# Patient Record
Sex: FEMALE | Race: WHITE | NOT HISPANIC OR LATINO | Employment: FULL TIME | URBAN - METROPOLITAN AREA
[De-identification: names, ages, dates, MRNs, and addresses within clinical notes are randomized per-mention and may not be internally consistent; named-entity substitution may affect disease eponyms.]

---

## 2016-07-06 LAB — CRC RECOMMENDATION EXT: NORMAL

## 2017-02-15 ENCOUNTER — HISTORICAL (OUTPATIENT)
Dept: ADMINISTRATIVE | Facility: HOSPITAL | Age: 47
End: 2017-02-15

## 2017-02-17 ENCOUNTER — HISTORICAL (OUTPATIENT)
Dept: RADIOLOGY | Facility: HOSPITAL | Age: 47
End: 2017-02-17

## 2022-04-10 ENCOUNTER — HISTORICAL (OUTPATIENT)
Dept: ADMINISTRATIVE | Facility: HOSPITAL | Age: 52
End: 2022-04-10

## 2022-04-22 ENCOUNTER — HISTORICAL (OUTPATIENT)
Dept: ADMINISTRATIVE | Facility: HOSPITAL | Age: 52
End: 2022-04-22

## 2022-04-25 VITALS
BODY MASS INDEX: 47.09 KG/M2 | HEIGHT: 66 IN | DIASTOLIC BLOOD PRESSURE: 88 MMHG | WEIGHT: 293 LBS | SYSTOLIC BLOOD PRESSURE: 152 MMHG | OXYGEN SATURATION: 98 %

## 2022-06-06 ENCOUNTER — HOSPITAL ENCOUNTER (INPATIENT)
Facility: HOSPITAL | Age: 52
LOS: 7 days | Discharge: HOME | DRG: 372 | End: 2022-06-13
Attending: EMERGENCY MEDICINE | Admitting: STUDENT IN AN ORGANIZED HEALTH CARE EDUCATION/TRAINING PROGRAM
Payer: COMMERCIAL

## 2022-06-06 DIAGNOSIS — Z90.3 H/O GASTRIC SLEEVE: ICD-10-CM

## 2022-06-06 DIAGNOSIS — R19.7 NAUSEA VOMITING AND DIARRHEA: ICD-10-CM

## 2022-06-06 DIAGNOSIS — E87.20 METABOLIC ACIDOSIS: ICD-10-CM

## 2022-06-06 DIAGNOSIS — E86.0 DEHYDRATION: Primary | ICD-10-CM

## 2022-06-06 DIAGNOSIS — R00.0 TACHYCARDIA: ICD-10-CM

## 2022-06-06 DIAGNOSIS — R11.2 NAUSEA VOMITING AND DIARRHEA: ICD-10-CM

## 2022-06-06 DIAGNOSIS — N17.9 ACUTE KIDNEY INJURY: ICD-10-CM

## 2022-06-06 LAB
ALBUMIN SERPL-MCNC: 4.9 GM/DL (ref 3.5–5)
ALBUMIN/GLOB SERPL: 1.2 RATIO (ref 1.1–2)
ALP SERPL-CCNC: 159 UNIT/L (ref 40–150)
ALT SERPL-CCNC: 62 UNIT/L (ref 0–55)
APPEARANCE UR: ABNORMAL
AST SERPL-CCNC: 48 UNIT/L (ref 5–34)
BACTERIA #/AREA URNS AUTO: ABNORMAL /HPF
BASOPHILS # BLD AUTO: 0.04 X10(3)/MCL (ref 0–0.2)
BASOPHILS NFR BLD AUTO: 0.5 %
BILIRUB UR QL STRIP.AUTO: NEGATIVE MG/DL
BILIRUBIN DIRECT+TOT PNL SERPL-MCNC: 0.7 MG/DL
BUN SERPL-MCNC: 19.7 MG/DL (ref 9.8–20.1)
CALCIUM SERPL-MCNC: 10.3 MG/DL (ref 8.4–10.2)
CAOX CRY URNS QL MICRO: ABNORMAL /HPF
CHLORIDE SERPL-SCNC: 111 MMOL/L (ref 98–107)
CO2 SERPL-SCNC: 12 MMOL/L (ref 22–29)
COLOR UR AUTO: ABNORMAL
CREAT SERPL-MCNC: 1.86 MG/DL (ref 0.55–1.02)
EOSINOPHIL # BLD AUTO: 0.05 X10(3)/MCL (ref 0–0.9)
EOSINOPHIL NFR BLD AUTO: 0.7 %
ERYTHROCYTE [DISTWIDTH] IN BLOOD BY AUTOMATED COUNT: 15.1 % (ref 11.5–17)
GLOBULIN SER-MCNC: 4.2 GM/DL (ref 2.4–3.5)
GLUCOSE SERPL-MCNC: 140 MG/DL (ref 74–100)
GLUCOSE UR QL STRIP.AUTO: NEGATIVE MG/DL
GRAN CASTS URNS QL MICRO: ABNORMAL /HPF
HCT VFR BLD AUTO: 50.6 % (ref 37–47)
HGB BLD-MCNC: 16.5 GM/DL (ref 12–16)
HYALINE CASTS URNS QL MICRO: ABNORMAL /HPF
IMM GRANULOCYTES # BLD AUTO: 0.03 X10(3)/MCL (ref 0–0.02)
IMM GRANULOCYTES NFR BLD AUTO: 0.4 % (ref 0–0.43)
KETONES UR QL STRIP.AUTO: ABNORMAL MG/DL
LEUKOCYTE ESTERASE UR QL STRIP.AUTO: ABNORMAL UNIT/L
LIPASE SERPL-CCNC: 29 U/L
LYMPHOCYTES # BLD AUTO: 1.06 X10(3)/MCL (ref 0.6–4.6)
LYMPHOCYTES NFR BLD AUTO: 13.9 %
MAGNESIUM SERPL-MCNC: 2.3 MG/DL (ref 1.6–2.6)
MCH RBC QN AUTO: 30.3 PG (ref 27–31)
MCHC RBC AUTO-ENTMCNC: 32.6 MG/DL (ref 33–36)
MCV RBC AUTO: 93 FL (ref 80–94)
MONOCYTES # BLD AUTO: 0.62 X10(3)/MCL (ref 0.1–1.3)
MONOCYTES NFR BLD AUTO: 8.1 %
NEUTROPHILS # BLD AUTO: 5.8 X10(3)/MCL (ref 2.1–9.2)
NEUTROPHILS NFR BLD AUTO: 76.4 %
NITRITE UR QL STRIP.AUTO: NEGATIVE
NRBC BLD AUTO-RTO: 0 %
PH UR STRIP.AUTO: 5.5 [PH]
PHOSPHATE SERPL-MCNC: 5.6 MG/DL (ref 2.3–4.7)
PLATELET # BLD AUTO: 274 X10(3)/MCL (ref 130–400)
PMV BLD AUTO: 11.5 FL (ref 9.4–12.4)
POTASSIUM SERPL-SCNC: 3.5 MMOL/L (ref 3.5–5.1)
PROT SERPL-MCNC: 9.1 GM/DL (ref 6.4–8.3)
PROT UR QL STRIP.AUTO: ABNORMAL MG/DL
RBC # BLD AUTO: 5.44 X10(6)/MCL (ref 4.2–5.4)
RBC #/AREA URNS AUTO: <5 /HPF
RBC UR QL AUTO: ABNORMAL UNIT/L
SARS-COV-2 RDRP RESP QL NAA+PROBE: NEGATIVE
SODIUM SERPL-SCNC: 140 MMOL/L (ref 136–145)
SP GR UR STRIP.AUTO: 1.02 (ref 1–1.03)
SQUAMOUS #/AREA URNS AUTO: 10 /LPF
UROBILINOGEN UR STRIP-ACNC: 0.2 MG/DL
WBC # SPEC AUTO: 7.6 X10(3)/MCL (ref 4.5–11.5)
WBC #/AREA URNS AUTO: 75 /HPF

## 2022-06-06 PROCEDURE — 96374 THER/PROPH/DIAG INJ IV PUSH: CPT

## 2022-06-06 PROCEDURE — 11000001 HC ACUTE MED/SURG PRIVATE ROOM

## 2022-06-06 PROCEDURE — 85025 COMPLETE CBC W/AUTO DIFF WBC: CPT | Performed by: EMERGENCY MEDICINE

## 2022-06-06 PROCEDURE — 81001 URINALYSIS AUTO W/SCOPE: CPT | Performed by: EMERGENCY MEDICINE

## 2022-06-06 PROCEDURE — 93005 ELECTROCARDIOGRAM TRACING: CPT

## 2022-06-06 PROCEDURE — 84100 ASSAY OF PHOSPHORUS: CPT | Performed by: EMERGENCY MEDICINE

## 2022-06-06 PROCEDURE — 96361 HYDRATE IV INFUSION ADD-ON: CPT

## 2022-06-06 PROCEDURE — 99285 EMERGENCY DEPT VISIT HI MDM: CPT | Mod: 25

## 2022-06-06 PROCEDURE — 87635 SARS-COV-2 COVID-19 AMP PRB: CPT | Performed by: EMERGENCY MEDICINE

## 2022-06-06 PROCEDURE — 63600175 PHARM REV CODE 636 W HCPCS: Performed by: PHYSICIAN ASSISTANT

## 2022-06-06 PROCEDURE — 83735 ASSAY OF MAGNESIUM: CPT | Performed by: EMERGENCY MEDICINE

## 2022-06-06 PROCEDURE — 84075 ASSAY ALKALINE PHOSPHATASE: CPT | Performed by: EMERGENCY MEDICINE

## 2022-06-06 PROCEDURE — 25000003 PHARM REV CODE 250: Performed by: EMERGENCY MEDICINE

## 2022-06-06 PROCEDURE — 36415 COLL VENOUS BLD VENIPUNCTURE: CPT | Performed by: EMERGENCY MEDICINE

## 2022-06-06 PROCEDURE — 83690 ASSAY OF LIPASE: CPT | Performed by: EMERGENCY MEDICINE

## 2022-06-06 PROCEDURE — 63600175 PHARM REV CODE 636 W HCPCS: Performed by: EMERGENCY MEDICINE

## 2022-06-06 PROCEDURE — 80053 COMPREHEN METABOLIC PANEL: CPT | Performed by: EMERGENCY MEDICINE

## 2022-06-06 RX ORDER — IBUPROFEN 200 MG
24 TABLET ORAL
Status: DISCONTINUED | OUTPATIENT
Start: 2022-06-06 | End: 2022-06-13 | Stop reason: HOSPADM

## 2022-06-06 RX ORDER — ONDANSETRON 2 MG/ML
4 INJECTION INTRAMUSCULAR; INTRAVENOUS EVERY 4 HOURS PRN
Status: DISCONTINUED | OUTPATIENT
Start: 2022-06-06 | End: 2022-06-13 | Stop reason: HOSPADM

## 2022-06-06 RX ORDER — SODIUM CHLORIDE 0.9 % (FLUSH) 0.9 %
10 SYRINGE (ML) INJECTION EVERY 12 HOURS PRN
Status: DISCONTINUED | OUTPATIENT
Start: 2022-06-06 | End: 2022-06-13 | Stop reason: HOSPADM

## 2022-06-06 RX ORDER — ENOXAPARIN SODIUM 100 MG/ML
40 INJECTION SUBCUTANEOUS EVERY 24 HOURS
Status: DISCONTINUED | OUTPATIENT
Start: 2022-06-06 | End: 2022-06-13 | Stop reason: HOSPADM

## 2022-06-06 RX ORDER — ONDANSETRON 2 MG/ML
8 INJECTION INTRAMUSCULAR; INTRAVENOUS ONCE
Status: COMPLETED | OUTPATIENT
Start: 2022-06-06 | End: 2022-06-06

## 2022-06-06 RX ORDER — ACETAMINOPHEN 325 MG/1
650 TABLET ORAL EVERY 8 HOURS PRN
Status: DISCONTINUED | OUTPATIENT
Start: 2022-06-06 | End: 2022-06-13 | Stop reason: HOSPADM

## 2022-06-06 RX ORDER — GLUCAGON 1 MG
1 KIT INJECTION
Status: DISCONTINUED | OUTPATIENT
Start: 2022-06-06 | End: 2022-06-13 | Stop reason: HOSPADM

## 2022-06-06 RX ORDER — ACETAMINOPHEN 325 MG/1
650 TABLET ORAL EVERY 4 HOURS PRN
Status: DISCONTINUED | OUTPATIENT
Start: 2022-06-06 | End: 2022-06-13 | Stop reason: HOSPADM

## 2022-06-06 RX ORDER — IBUPROFEN 200 MG
16 TABLET ORAL
Status: DISCONTINUED | OUTPATIENT
Start: 2022-06-06 | End: 2022-06-13 | Stop reason: HOSPADM

## 2022-06-06 RX ADMIN — ONDANSETRON 4 MG: 2 INJECTION INTRAMUSCULAR; INTRAVENOUS at 08:06

## 2022-06-06 RX ADMIN — THIAMINE HYDROCHLORIDE 100 MG: 100 INJECTION, SOLUTION INTRAMUSCULAR; INTRAVENOUS at 11:06

## 2022-06-06 RX ADMIN — SODIUM BICARBONATE: 84 INJECTION, SOLUTION INTRAVENOUS at 11:06

## 2022-06-06 RX ADMIN — SODIUM CHLORIDE, POTASSIUM CHLORIDE, SODIUM LACTATE AND CALCIUM CHLORIDE 1000 ML: 600; 310; 30; 20 INJECTION, SOLUTION INTRAVENOUS at 08:06

## 2022-06-06 RX ADMIN — ENOXAPARIN SODIUM 40 MG: 40 INJECTION SUBCUTANEOUS at 04:06

## 2022-06-06 RX ADMIN — SODIUM BICARBONATE: 84 INJECTION, SOLUTION INTRAVENOUS at 03:06

## 2022-06-06 RX ADMIN — ONDANSETRON 8 MG: 2 INJECTION INTRAMUSCULAR; INTRAVENOUS at 08:06

## 2022-06-06 NOTE — H&P
[facility]  Hospital Medicine History & Physical Examination       Patient Name: Zoe Randall  MRN: [id number]  Patient Class: IP- Inpatient   Admission Date: 6/6/2022   Admitting Physician: Seun Ward MD   Length of Stay: 0  Attending Physician: Seun Ward MD   Primary Care Provider: Bonnie Bella MD  Face-to-Face encounter date: 06/06/2022  Code Status: Full Code  Chief Complaint: Abdominal Pain, Vomiting, and Diarrhea (Pt presents with generalized abd pain with n/v/d. Onset Friday.  Denies fever.  Gastric sleeve in April/ dr oneil in [facility]. )        Patient information was obtained from patient, patient's family, past medical records and ER records.     HISTORY OF PRESENT ILLNESS:   Zoe Randall is a 51 y.o. White female with a past medical history of gastric sleeve 2017 and duodenal switch on April 5th, 2022 in [place], [place] with Dr. Franck Choi. The patient presented to [facility] on 6/6/2022 with a primary complaint of generalized abdominal cramping, vomiting and diarrhea which began 6/3/2022 after eating chicken crackling. Patient reports she was started on a full regular diet on week 8 and has been tolerating other foods well. At start of symptoms patient was having 2-3 episodes of diarrhea every hour but over the last day once every 3 hours. She states vomiting began yesterday (6/5/2022).  She reports associated symptoms of nausea, headache and muscle cramps.  She denies complaints of fever, chest pain, shortness a breath and sick contacts. She reports an approximate 20 pound weight loss since onset of symptoms.      Upon presentation to the ED, heart rate 124 and hemodynamically stable. Labs notable for H&H 16.5/50.6, WBC 7.6, chloride 111, CO2 12, BUN/creatinine 19.7/1.86, glucose 140, alkaline phosphatase 159, AST 48 and ALT 62. In the ED patient received Thiamine and Zofran. Patient is admitted to hospital medicine services for further medical management.      PAST MEDICAL HISTORY:   Reviewed and negative    PAST SURGICAL HISTORY:   Gastric sleeve with duodenal switch on 4/5/2022 in [place], [place]  Meniscus repair  Cholecystectomy  Breast reduction    ALLERGIES:   Patient has no known allergies.    FAMILY HISTORY:   Reviewed and negative    SOCIAL HISTORY:   Tobacco - Denies  Alcohol - Denies  Illicit Drugs - Denies    HOME MEDICATIONS:   As documented    REVIEW OF SYSTEMS:   Except as documented, all other systems reviewed and negative     PHYSICAL EXAM:     VITAL SIGNS: 24 HRS MIN & MAX LAST   Temp  Min: 97.5 °F (36.4 °C)  Max: 97.5 °F (36.4 °C) 97.5 °F (36.4 °C)   BP  Min: 114/87  Max: 131/87 114/87   Pulse  Min: 119  Max: 124  (!) 119   Resp  Min: 19  Max: 20 19   SpO2  Min: 96 %  Max: 96 % 96 %       General appearance: Well-developed, well-nourished female in no apparent distress. No family at bedside. Eating clear liquid diet.  HEENT: Atraumatic head. Moist mucous membranes of oral cavity.  Lungs: Clear to auscultation bilaterally.   Heart: Tachycardic rate and rhythm.   Abdomen: Soft, non-distended, non-tender. Bowel sounds are normal.   Extremities: No cyanosis, clubbing. No deformities.  Skin: No Rash. Warm and dry.  Neuro: Awake, alert and oriented. Motor and sensory exams grossly intact.  Psych/mental status: Appropriate mood and affect. Cooperative. Responds appropriately to questions.       LABS AND IMAGING:     Recent Labs   Lab 06/06/22  0842   WBC 7.6   RBC 5.44*   HGB 16.5*   HCT 50.6*   MCV 93.0   MCH 30.3   MCHC 32.6*   RDW 15.1      MPV 11.5       Recent Labs   Lab 06/06/22  0842      K 3.5   CO2 12*   BUN 19.7   CREATININE 1.86*   CALCIUM 10.3*   MG 2.30   ALBUMIN 4.9   ALKPHOS 159*   ALT 62*   AST 48*   BILITOT 0.7       Microbiology Results (last 7 days)       ** No results found for the last 168 hours. **             CT Abdomen Pelvis With Contrast  Clinical History:  Abdominal Pain     Technique:  Multidetector IV contrast enhanced  axial CT images of the abdomen and  pelvis were obtained with coronal and sagittal reconstructions.      Automatic exposure control was utilized to reduce the patient's  radiation dose.     [id number]=[id number]     Comparison:  5/5/2014     Findings:     01. HEPATOBILIARY: No focal hepatic lesion is identified, status post  cholecystectomy.     02. SPLEEN: Normal     03. PANCREAS: No focal masses or ductal dilatation.     04. ADRENALS: No adrenal nodules.     05. KIDNEYS: The right kidney demonstrates no stone, hydronephrosis,  or hydroureter. No focal mass identified.The left kidney demonstrates  no stone, hydronephrosis, or hydroureter. No focal mass identified.     06. LYMPHADENOPATHY/RETROPERITONEUM: There is no retroperitoneal  lymphadenopathy. The abdominal aorta is normal in course and caliber.      07. BOWEL: Postsurgical changes of the stomach. No acute abnormality.     08. PELVIC VISCERA: Intrauterine IUD noted.     09. PELVIC LYMPH NODES: No lymphadenopathy.     10. PERITONEUM/ABDOMINAL WALL: No ascites or implant.     11. SKELETAL: No aggressive appearing lytic/blastic lesion. No acute  fractures, subluxations or dislocations.     12. LUNG BASES: The visualized lungs are unremarkable.     Impression  No acute abnormality identified within the abdomen and pelvis.  Postsurgical changes of the stomach are noted with no acute  abnormality.       Electronically Signed By: Artis Pickard MD  Date/Time Signed: 04/22/2022 18:34        ASSESSMENT & PLAN:   Assessment:  Intravascular volume depletion secondary to intractable nausea vomiting  Status post gastric sleeve and duodenal switch on 4/5/2022  NAGMA   Polycythemia  Acute kidney injury  Elevated alkaline phosphatase    Plan:  - Clear liquid diet as tolerated  - IVF hydration  - General surgery consulted. Appreciate recommendations  - Labs in AM       VTE Prophylaxis: will be placed on Lovenox for DVT prophylaxis and will be advised to be as mobile as possible and  sit in a chair as tolerated      __________________________________________________________________________  INPATIENT LIST OF MEDICATIONS     Current Facility-Administered Medications:     acetaminophen tablet 650 mg, 650 mg, Oral, Q8H PRN, MIHAELA Sun-MARVEL    acetaminophen tablet 650 mg, 650 mg, Oral, Q4H PRN, MIHAELA Sun-MARVEL    dextrose 10% bolus 125 mL, 12.5 g, Intravenous, PRN, MIHAELA Sun-MARVEL    dextrose 10% bolus 250 mL, 25 g, Intravenous, PRN, MIHAELA Sun-MARVEL    enoxaparin injection 40 mg, 40 mg, Subcutaneous, Daily, Nargis Pelletier PA-C    glucagon (human recombinant) injection 1 mg, 1 mg, Intramuscular, PRN, Nargis Pelletier PA-C    glucose chewable tablet 16 g, 16 g, Oral, PRN, MIHAELA Sun-MARVEL    glucose chewable tablet 24 g, 24 g, Oral, PRN, Nargis Pelletier PA-C    ondansetron injection 4 mg, 4 mg, Intravenous, Q4H PRN, Nargis Pelletier PA-C    sodium bicarbonate 150 mEq in dextrose 5 % 1,150 mL infusion, , Intravenous, Continuous, Karina Leon MD    sodium chloride 0.9% flush 10 mL, 10 mL, Intravenous, Q12H PRN, Nargis Pelletier PA-C    thiamine (B-1) 100 mg in dextrose 5 % 100 mL IVPB, 100 mg, Intravenous, ED 1 Time, Karina Leon MD, Last Rate: 200 mL/hr at 06/06/22 1104, 100 mg at 06/06/22 1104  No current outpatient medications on file.      Scheduled Meds:   enoxaparin  40 mg Subcutaneous Daily    thiamine (VITAMIN B1) IVPB  100 mg Intravenous ED 1 Time     Continuous Infusions:   sodium bicarbonate drip       PRN Meds:.acetaminophen, acetaminophen, dextrose 10%, dextrose 10%, glucagon (human recombinant), glucose, glucose, ondansetron, sodium chloride 0.9%      Discharge Planning and Disposition: Anticipated discharge to be determined.    I, MIHAELA Trinh, have reviewed and discussed the case with Dr. Seun Ward MD    Please see the following addendum for further assessment and plan from there attending MD.    Nargis Pelletier,  PA-C  06/06/2022    ________________________________________________________________________________    MD Addendum:  I, Dr. Seun Ward MD assumed care of this patient today at 12:00 pm  For the patient encounter, I performed the substantive portion of the visit, I reviewed the NP/PA documentation, treatment plan, and medical decision making.  I had face to face time with this patient     A. History:  Seen and examined the patient reviewed above history physical exam and management.  She is coming with excessive diarrhea and abdominal cramping started 2 days ago after she advanced her die.   Since the surgery patient lost almost 50 lb.  However in last 2 days she lost reportedly 18 lb most likely from diarrhea.  She denies having any other issues or any other symptoms.  She denies having any other medical issues.    Agree with above physical exam.     C. Medical decision making:  Will give patient 1 the stage of LR bolus, she received 1 L in the ER.  Continue with bicarb drip 125 cc/hour afterwards.  Monitor labs.  Will add Imodium 2 mg Q 4.  Appreciate bariatric surgery recommendations,   - continue thiamine supplementation.         All diagnosis and differential diagnosis have been reviewed; assessment and plan has been documented; I have personally reviewed the labs and test results that are presently available; I have reviewed the patients medication list; I have reviewed the consulting providers response and recommendations. I have reviewed or attempted to review medical records based upon their availability.    All of the patient and family questions have been addressed and answered. Patient's is agreeable to the above stated plan. I will continue to monitor closely and make adjustments to medical management as needed.      Seun Ward MD  06/06/2022

## 2022-06-07 LAB
ALBUMIN SERPL-MCNC: 3.6 GM/DL (ref 3.5–5)
ALBUMIN/GLOB SERPL: 1.1 RATIO (ref 1.1–2)
ALP SERPL-CCNC: 117 UNIT/L (ref 40–150)
ALT SERPL-CCNC: 43 UNIT/L (ref 0–55)
AST SERPL-CCNC: 30 UNIT/L (ref 5–34)
BASOPHILS # BLD AUTO: 0.04 X10(3)/MCL (ref 0–0.2)
BASOPHILS NFR BLD AUTO: 0.8 %
BILIRUBIN DIRECT+TOT PNL SERPL-MCNC: 0.6 MG/DL
BUN SERPL-MCNC: 12.1 MG/DL (ref 9.8–20.1)
CALCIUM SERPL-MCNC: 9.4 MG/DL (ref 8.4–10.2)
CHLORIDE SERPL-SCNC: 107 MMOL/L (ref 98–107)
CO2 SERPL-SCNC: 21 MMOL/L (ref 22–29)
CREAT SERPL-MCNC: 0.78 MG/DL (ref 0.55–1.02)
EOSINOPHIL # BLD AUTO: 0.17 X10(3)/MCL (ref 0–0.9)
EOSINOPHIL NFR BLD AUTO: 3.2 %
ERYTHROCYTE [DISTWIDTH] IN BLOOD BY AUTOMATED COUNT: 14.6 % (ref 11.5–17)
GLOBULIN SER-MCNC: 3.4 GM/DL (ref 2.4–3.5)
GLUCOSE SERPL-MCNC: 120 MG/DL (ref 74–100)
HCT VFR BLD AUTO: 40.8 % (ref 37–47)
HGB BLD-MCNC: 13.5 GM/DL (ref 12–16)
IMM GRANULOCYTES # BLD AUTO: 0.02 X10(3)/MCL (ref 0–0.02)
IMM GRANULOCYTES NFR BLD AUTO: 0.4 % (ref 0–0.43)
LYMPHOCYTES # BLD AUTO: 1.51 X10(3)/MCL (ref 0.6–4.6)
LYMPHOCYTES NFR BLD AUTO: 28.5 %
MCH RBC QN AUTO: 30.1 PG (ref 27–31)
MCHC RBC AUTO-ENTMCNC: 33.1 MG/DL (ref 33–36)
MCV RBC AUTO: 91.1 FL (ref 80–94)
MONOCYTES # BLD AUTO: 0.62 X10(3)/MCL (ref 0.1–1.3)
MONOCYTES NFR BLD AUTO: 11.7 %
NEUTROPHILS # BLD AUTO: 2.9 X10(3)/MCL (ref 2.1–9.2)
NEUTROPHILS NFR BLD AUTO: 55.4 %
NRBC BLD AUTO-RTO: 0 %
PLATELET # BLD AUTO: 181 X10(3)/MCL (ref 130–400)
PMV BLD AUTO: 11.3 FL (ref 9.4–12.4)
POTASSIUM SERPL-SCNC: 2 MMOL/L (ref 3.5–5.1)
PROT SERPL-MCNC: 7 GM/DL (ref 6.4–8.3)
RBC # BLD AUTO: 4.48 X10(6)/MCL (ref 4.2–5.4)
SODIUM SERPL-SCNC: 139 MMOL/L (ref 136–145)
WBC # SPEC AUTO: 5.3 X10(3)/MCL (ref 4.5–11.5)

## 2022-06-07 PROCEDURE — 25000003 PHARM REV CODE 250: Performed by: EMERGENCY MEDICINE

## 2022-06-07 PROCEDURE — 80053 COMPREHEN METABOLIC PANEL: CPT | Performed by: PHYSICIAN ASSISTANT

## 2022-06-07 PROCEDURE — 25000003 PHARM REV CODE 250: Performed by: PHYSICIAN ASSISTANT

## 2022-06-07 PROCEDURE — 11000001 HC ACUTE MED/SURG PRIVATE ROOM

## 2022-06-07 PROCEDURE — 36415 COLL VENOUS BLD VENIPUNCTURE: CPT | Performed by: PHYSICIAN ASSISTANT

## 2022-06-07 PROCEDURE — 25000003 PHARM REV CODE 250: Performed by: STUDENT IN AN ORGANIZED HEALTH CARE EDUCATION/TRAINING PROGRAM

## 2022-06-07 PROCEDURE — 63600175 PHARM REV CODE 636 W HCPCS: Performed by: NURSE PRACTITIONER

## 2022-06-07 PROCEDURE — 25000003 PHARM REV CODE 250: Performed by: NURSE PRACTITIONER

## 2022-06-07 PROCEDURE — 63600175 PHARM REV CODE 636 W HCPCS: Performed by: PHYSICIAN ASSISTANT

## 2022-06-07 PROCEDURE — 85025 COMPLETE CBC W/AUTO DIFF WBC: CPT | Performed by: PHYSICIAN ASSISTANT

## 2022-06-07 RX ORDER — LOPERAMIDE HYDROCHLORIDE 2 MG/1
2 CAPSULE ORAL 4 TIMES DAILY PRN
Status: DISCONTINUED | OUTPATIENT
Start: 2022-06-07 | End: 2022-06-08

## 2022-06-07 RX ORDER — POTASSIUM CHLORIDE 20 MEQ/1
40 TABLET, EXTENDED RELEASE ORAL EVERY 4 HOURS
Status: COMPLETED | OUTPATIENT
Start: 2022-06-07 | End: 2022-06-07

## 2022-06-07 RX ORDER — DIPHENOXYLATE HYDROCHLORIDE AND ATROPINE SULFATE 2.5; .025 MG/1; MG/1
1 TABLET ORAL 4 TIMES DAILY
Status: DISCONTINUED | OUTPATIENT
Start: 2022-06-07 | End: 2022-06-08

## 2022-06-07 RX ORDER — POTASSIUM CHLORIDE 14.9 MG/ML
40 INJECTION INTRAVENOUS ONCE
Status: COMPLETED | OUTPATIENT
Start: 2022-06-07 | End: 2022-06-07

## 2022-06-07 RX ADMIN — POTASSIUM BICARBONATE 50 MEQ: 977.5 TABLET, EFFERVESCENT ORAL at 06:06

## 2022-06-07 RX ADMIN — ENOXAPARIN SODIUM 40 MG: 40 INJECTION SUBCUTANEOUS at 04:06

## 2022-06-07 RX ADMIN — POTASSIUM CHLORIDE 40 MEQ: 20 TABLET, EXTENDED RELEASE ORAL at 10:06

## 2022-06-07 RX ADMIN — SODIUM BICARBONATE: 84 INJECTION, SOLUTION INTRAVENOUS at 10:06

## 2022-06-07 RX ADMIN — DIPHENOXYLATE HYDROCHLORIDE AND ATROPINE SULFATE 1 TABLET: 2.5; .025 TABLET ORAL at 08:06

## 2022-06-07 RX ADMIN — POTASSIUM CHLORIDE 40 MEQ: 20 TABLET, EXTENDED RELEASE ORAL at 05:06

## 2022-06-07 RX ADMIN — ACETAMINOPHEN 650 MG: 325 TABLET ORAL at 04:06

## 2022-06-07 RX ADMIN — POTASSIUM CHLORIDE 40 MEQ: 14.9 INJECTION, SOLUTION INTRAVENOUS at 05:06

## 2022-06-07 RX ADMIN — LOPERAMIDE HYDROCHLORIDE 2 MG: 2 CAPSULE ORAL at 09:06

## 2022-06-07 RX ADMIN — DIPHENOXYLATE HYDROCHLORIDE AND ATROPINE SULFATE 1 TABLET: 2.5; .025 TABLET ORAL at 04:06

## 2022-06-07 RX ADMIN — POTASSIUM CHLORIDE 40 MEQ: 20 TABLET, EXTENDED RELEASE ORAL at 02:06

## 2022-06-07 NOTE — PROGRESS NOTES
[facility] Hospital Medicine Progress Note        Chief Complaint: Inpatient Follow-up for     Subjective:  Zoe Randall is a 51 y.o. White female with a past medical history of gastric sleeve 2017 and duodenal switch on April 5th, 2022 in [place], [place] with Dr. Franck Choi. The patient presented to [facility] on 6/6/2022 with a primary complaint of generalized abdominal cramping, vomiting and diarrhea which began 6/3/2022 after eating chicken crackling. Patient reports she was started on a full regular diet on week 8 and has been tolerating other foods well. At start of symptoms patient was having 2-3 episodes of diarrhea every hour but over the last day once every 3 hours. She states vomiting began yesterday (6/5/2022).  She reports associated symptoms of nausea, headache and muscle cramps.  She denies complaints of fever, chest pain, shortness a breath and sick contacts. She reports an approximate 20 pound weight loss since onset of symptoms.       Upon presentation to the ED, heart rate 124 and hemodynamically stable. Labs notable for H&H 16.5/50.6, WBC 7.6, chloride 111, CO2 12, BUN/creatinine 19.7/1.86, glucose 140, alkaline phosphatase 159, AST 48 and ALT 62. In the ED patient received Thiamine and Zofran. Patient is admitted to hospital medicine services for further medical management.     Objective/physical exam:  General appearance: Well-developed, well-nourished female in no apparent distress. No family at bedside. Eating clear liquid diet.  HEENT: Atraumatic head. Moist mucous membranes of oral cavity.  Lungs: Clear to auscultation bilaterally.   Heart: Tachycardic rate and rhythm.   Abdomen: Soft, non-distended, non-tender. Bowel sounds are normal.   Extremities: No cyanosis, clubbing. No deformities.  Skin: No Rash. Warm and dry.  Neuro: Awake, alert and oriented. Motor and sensory exams grossly intact.  Psych/mental status: Appropriate mood and affect. Cooperative.  Responds appropriately to questions.     VITAL SIGNS: 24 HRS MIN & MAX LAST   Temp  Min: 97.7 °F (36.5 °C)  Max: 98.2 °F (36.8 °C) 98 °F (36.7 °C)   BP  Min: 101/68  Max: 114/84 113/80   Pulse  Min: 73  Max: 92  83   Resp  Min: 16  Max: 18 18   SpO2  Min: 96 %  Max: 99 % 97 %       Labs, Microbiology and Imaging were Reviewed.      Microbiology Results (last 7 days)     Procedure Component Value Units Date/Time    Urine culture [[id number]] Collected: 06/06/22 1026    Order Status: Completed Specimen: Urine Updated: 06/07/22 0700     Urine Culture No Growth At 24 Hours             Medications   diphenoxylate-atropine 2.5-0.025 mg  1 tablet Oral QID    enoxaparin  40 mg Subcutaneous Daily    potassium chloride  40 mEq Oral Q4H        acetaminophen, acetaminophen, dextrose 10%, dextrose 10%, glucagon (human recombinant), glucose, glucose, loperamide, ondansetron, sodium chloride 0.9%     Radiology:  CT Abdomen Pelvis With Contrast  Clinical History:  Abdominal Pain     Technique:  Multidetector IV contrast enhanced axial CT images of the abdomen and  pelvis were obtained with coronal and sagittal reconstructions.      Automatic exposure control was utilized to reduce the patient's  radiation dose.     JVQ=[id number]     Comparison:  5/5/2014     Findings:     01. HEPATOBILIARY: No focal hepatic lesion is identified, status post  cholecystectomy.     02. SPLEEN: Normal     03. PANCREAS: No focal masses or ductal dilatation.     04. ADRENALS: No adrenal nodules.     05. KIDNEYS: The right kidney demonstrates no stone, hydronephrosis,  or hydroureter. No focal mass identified.The left kidney demonstrates  no stone, hydronephrosis, or hydroureter. No focal mass identified.     06. LYMPHADENOPATHY/RETROPERITONEUM: There is no retroperitoneal  lymphadenopathy. The abdominal aorta is normal in course and caliber.      07. BOWEL: Postsurgical changes of the stomach. No acute abnormality.     08. PELVIC VISCERA: Intrauterine IUD  noted.     09. PELVIC LYMPH NODES: No lymphadenopathy.     10. PERITONEUM/ABDOMINAL WALL: No ascites or implant.     11. SKELETAL: No aggressive appearing lytic/blastic lesion. No acute  fractures, subluxations or dislocations.     12. LUNG BASES: The visualized lungs are unremarkable.     Impression  No acute abnormality identified within the abdomen and pelvis.  Postsurgical changes of the stomach are noted with no acute  abnormality.       Electronically Signed By: Artis Pickard MD  Date/Time Signed: 04/22/2022 18:34          Assessment/Plan:  Intravascular volume depletion secondary to intractable nausea vomiting  Status post gastric sleeve and duodenal switch on 4/5/2022  NAGMA   Hypokalemia   Polycythemia  Acute kidney injury  Elevated alkaline phosphatase      - Continue with bicarb drip 125 cc/hour for now.   - continue Imodium 2 mg Q4 and lomotil q4   - encouraged PO intake   Appreciate bariatric surgery recommendations,   - Continue thiamine supplementation.   - Continue to replace the potassium K.     Critical care diagnosis hypokalemia replacing   Critical care time was given 45 min     All diagnosis and differential diagnosis have been reviewed; assessment and plan has been documented; I have personally reviewed the labs and test results that are presently available; I have reviewed the patients medication list; I have reviewed the consulting providers response and recommendations. I have reviewed or attempted to review medical records based upon their availability.       Seun Ward MD   06/07/2022

## 2022-06-07 NOTE — PLAN OF CARE
06/07/22 1214   Discharge Assessment   Assessment Type Discharge Planning Assessment   Source of Information patient   Reason For Admission tachycardia, TRINH   Lives With spouse;child(linda), dependent   Do you expect to return to your current living situation? Yes   Do you have help at home or someone to help you manage your care at home? Yes   Who are your caregiver(s) and their phone number(s)? Te, significant other, [phone]   Prior to hospitilization cognitive status: Alert/Oriented;No Deficits   Current cognitive status: Alert/Oriented;No Deficits   Walking or Climbing Stairs Difficulty none   Dressing/Bathing Difficulty none   Equipment Currently Used at Home none   Do you currently have service(s) that help you manage your care at home? No   Who is going to help you get home at discharge? Te   How do you get to doctors appointments? car, drives self;family or friend will provide   Are you on dialysis? No   Do you take coumadin? No   Discharge Plan A Home with family   Discharge Plan B Home with family   Discharge Plan discussed with: Patient   Relationship/Environment   Name(s) of Who Lives With Patient Te   Pt lives w/ significant other and 6 year old daughter. Pt independent prior to admission. No HH. No DME.

## 2022-06-07 NOTE — PROGRESS NOTES
"[facility] - 8th Floor Med Surg  Adult Nutrition  Progress Note    SUMMARY       Recommendations    Recommendation/Intervention:   1- Advance diet as tolerated per MD. Goal Diet per MD.     2- Replete potassium as medically feasible.    3- If unable to advance diet in the next 2-3 days, consider PPN. Recommend Clinimix E 4.25/5 @ 75mL/hr with IVPB IL 20% 250mL twice per week.    Assessment and Plan  6/7/22: Unable to visit with pt during rounds- will f/u early with pt; pt on clears- noted admitted with n/v.     Malnutrition Assessment  Malnutrition Type: acute illness or injury, other (see comments) (unable to evaluate at this time)    Reason for Assessment    Reason For Assessment: identified at risk by screening criteria (MST >/=2)  Diagnosis:  Intravascular volume depletion secondary to intractable nausea vomiting  Status post gastric sleeve and duodenal switch on 4/5/2022  NAGMA   Polycythemia  Acute kidney injury  Elevated alkaline phosphatase  Relevant Medical History: gastric sleeve 2017 and duodenal switch on April 5th, 2022    Nutrition Risk Screen    Nutrition Risk Screen: no indicators present    Nutrition/Diet History    Factors Affecting Nutritional Intake: clear liquid diet    Anthropometrics    Temp: 98.2 °F (36.8 °C)  Height Method: Stated  Height: 5' 5" (165.1 cm)  Height (inches): 65 in  Weight Method: Stated  Weight: 118.4 kg (261 lb)  Weight (lb): 261 lb  Ideal Body Weight (IBW), Female: 125 lb  % Ideal Body Weight, Female (lb): 208.8 %  BMI (Calculated): 43.4  BMI Grade: greater than 40 - morbid obesity    Lab/Procedures/Meds    Pertinent Labs Reviewed: reviewed  Pertinent Labs Comments: 6/7/22: K 2, Glu 120, GFR>60  Pertinent Medications Reviewed: reviewed  Pertinent Medications Comments: loperamide PRN, Zofran PRN, Kcl, sodium bicarbonate    Estimated/Assessed Needs    Weight Used For Calorie Calculations: 118.4 kg (261 lb 0.4 oz)  Energy Calorie Requirements (kcal): 1980 (MSJ x " 1.1 SF)  Energy Need Method: [place]- Emy  Protein Requirements: 131gm (1.1g/kg)  Weight Used For Protein Calculations: 118.4 kg (261 lb 0.4 oz)  Fluid Requirements (mL): 1980  Estimated Fluid Requirement Method: RDA Method  RDA Method (mL): 1980    Nutrition Prescription Ordered    Current Diet Order: Clear Liquid Diet    Evaluation of Received Nutrient/Fluid Intake    Energy Calories Required: not meeting needs  Protein Required: not meeting needs  Fluid Required: not meeting needs  % Intake of Estimated Energy Needs: 0 - 25 %  % Meal Intake: Other: unable to evaluate    Nutrition Risk    Level of Risk/Frequency of Follow-up: moderate     Nutrition Problem  Inadequate Energy Intake    Related to (etiology):   Current condition    Signs and Symptoms (as evidenced by):   NPO/clear liquid diet since admit    Interventions(treatment strategy):  Modified composition of meals / snacks, Multivitamin / Mineral supplement therapy and Collaboration with other providers    Nutrition Diagnosis Status:   New    Goals: Meet >/=75% est energy needs by f/u  Nutrition Goal Status: new    Monitor and Evaluation    Food and Nutrient Intake: food and beverage intake, energy intake  Anthropometric Measurements: weight  Biochemical Data, Medical Tests and Procedures: electrolyte and renal panel     Nutrition Follow-Up    RD Follow-up?: Yes

## 2022-06-07 NOTE — CONSULTS
"Date of consult: 6-7-2022    Reason for consult: Dehydration/diarrhea s/p laparoscopic conversion vertical sleeve gastrectomy to duodenal switch.    HPI:  Zoe Randall is a 51 y.o. White female with a past medical history of gastric sleeve 2017 and duodenal switch on April 5th, 2022 in [place], [place] with Dr. Franck Choi. The patient presented to [facility] on 6/6/2022 with a primary complaint of generalized abdominal cramping, vomiting and diarrhea which began 6/3/2022 after eating chicken crackling. Patient reports she was started on a full regular diet on week 8 and has been tolerating other foods well. At start of symptoms patient was having 2-3 episodes of diarrhea every hour but over the last day once every 3 hours. She states vomiting began yesterday (6/5/2022).  She reports associated symptoms of nausea, headache and muscle cramps.  She denies complaints of fever, chest pain, shortness a breath and sick contacts. She reports an approximate 20 pound weight loss since onset of symptoms.       Upon presentation to the ED, heart rate 124 and hemodynamically stable. Labs notable for H&H 16.5/50.6, WBC 7.6, chloride 111, CO2 12, BUN/creatinine 19.7/1.86, glucose 140, alkaline phosphatase 159, AST 48 and ALT 62. In the ED patient received Thiamine and Zofran.     Above take from initial h/p  I discussed her bariatric hx as well  Prior Lap Lap band and Lap band removal  Lap VSG 2017, lost 50 lbs, gained all wt back  Lap Duodenal switch April 2022 in [place]   She was around 330lbs at time of last surgery  Is now around 275 lbs  Was able to eat a more "normal diet" weeks after latest surgery, but 2 days ago, developed severe pain, nausea, worsening diarrhea after eating cracklins.   She is used to having more lose bowel movements in [facility] her DS surgery.  She presented to hospital with worsening of symptoms and indeed suffers from naida/dehydration/hypertranaminasemia and above listed derangements.    She does " "feel that she eats compulsively and is considering joining or has joined [employer].  It is unclear if she was binge eating at time of change in symptoms, but likely was far off of her post bariatric diet plan        Exam:   aox4 nad  Cn II-XII grossly intact BL, no upper or lower motor or sensory deficits  PERRLA NCAT  rrr no rmg  cta bl no w/r/r  abd obese, laparoscopic incisions are healing well, nontender, non distended bs+      A/P    Dehydration, naida, hypokalemia diarrhea s/p lap vsg converted to DS two months ago  Agree with resuscitation efforts,  Will consult bariatric nutritionist to help steer her into better eating plan  May be discharged with electrolyte deficiencies are corrected and dehydration corrected,She seems to have improved since admission  I did attempt to notify her bariatric Surgeon,and spoke with his partner on call to explain the details of her admission. The duodenal switch is often an effective treatment for refractory obesity, a condition she clearly suffers from, however, it is a considered a "malabsorptive" procedure and does come with risk of dehydration and malnutrition which occasionally requires reversal.        "

## 2022-06-08 LAB
ALBUMIN SERPL-MCNC: 4.1 GM/DL (ref 3.5–5)
ALBUMIN/GLOB SERPL: 1.2 RATIO (ref 1.1–2)
ALP SERPL-CCNC: 132 UNIT/L (ref 40–150)
ALT SERPL-CCNC: 55 UNIT/L (ref 0–55)
AST SERPL-CCNC: 40 UNIT/L (ref 5–34)
BACTERIA UR CULT: NO GROWTH
BILIRUBIN DIRECT+TOT PNL SERPL-MCNC: 0.7 MG/DL
BUN SERPL-MCNC: 8.5 MG/DL (ref 9.8–20.1)
CALCIUM SERPL-MCNC: 9.7 MG/DL (ref 8.4–10.2)
CHLORIDE SERPL-SCNC: 104 MMOL/L (ref 98–107)
CO2 SERPL-SCNC: 27 MMOL/L (ref 22–29)
CREAT SERPL-MCNC: 0.79 MG/DL (ref 0.55–1.02)
GLOBULIN SER-MCNC: 3.3 GM/DL (ref 2.4–3.5)
GLUCOSE SERPL-MCNC: 112 MG/DL (ref 74–100)
POTASSIUM SERPL-SCNC: 3.1 MMOL/L (ref 3.5–5.1)
PROT SERPL-MCNC: 7.4 GM/DL (ref 6.4–8.3)
SODIUM SERPL-SCNC: 142 MMOL/L (ref 136–145)

## 2022-06-08 PROCEDURE — 11000001 HC ACUTE MED/SURG PRIVATE ROOM

## 2022-06-08 PROCEDURE — 25000003 PHARM REV CODE 250: Performed by: EMERGENCY MEDICINE

## 2022-06-08 PROCEDURE — 80053 COMPREHEN METABOLIC PANEL: CPT | Performed by: STUDENT IN AN ORGANIZED HEALTH CARE EDUCATION/TRAINING PROGRAM

## 2022-06-08 PROCEDURE — 63600175 PHARM REV CODE 636 W HCPCS: Performed by: PHYSICIAN ASSISTANT

## 2022-06-08 PROCEDURE — 25000003 PHARM REV CODE 250: Performed by: STUDENT IN AN ORGANIZED HEALTH CARE EDUCATION/TRAINING PROGRAM

## 2022-06-08 PROCEDURE — 36415 COLL VENOUS BLD VENIPUNCTURE: CPT | Performed by: STUDENT IN AN ORGANIZED HEALTH CARE EDUCATION/TRAINING PROGRAM

## 2022-06-08 RX ORDER — LOPERAMIDE HYDROCHLORIDE 2 MG/1
4 CAPSULE ORAL 4 TIMES DAILY
Status: DISCONTINUED | OUTPATIENT
Start: 2022-06-08 | End: 2022-06-12

## 2022-06-08 RX ORDER — DIPHENOXYLATE HYDROCHLORIDE AND ATROPINE SULFATE 2.5; .025 MG/1; MG/1
2 TABLET ORAL 4 TIMES DAILY
Status: DISCONTINUED | OUTPATIENT
Start: 2022-06-08 | End: 2022-06-13 | Stop reason: HOSPADM

## 2022-06-08 RX ORDER — POTASSIUM CHLORIDE 20 MEQ/1
40 TABLET, EXTENDED RELEASE ORAL 2 TIMES DAILY
Status: DISCONTINUED | OUTPATIENT
Start: 2022-06-08 | End: 2022-06-08

## 2022-06-08 RX ORDER — POTASSIUM CHLORIDE 20 MEQ/1
40 TABLET, EXTENDED RELEASE ORAL 2 TIMES DAILY
Status: COMPLETED | OUTPATIENT
Start: 2022-06-08 | End: 2022-06-09

## 2022-06-08 RX ADMIN — LOPERAMIDE HYDROCHLORIDE 4 MG: 2 CAPSULE ORAL at 05:06

## 2022-06-08 RX ADMIN — DIPHENOXYLATE HYDROCHLORIDE AND ATROPINE SULFATE 1 TABLET: 2.5; .025 TABLET ORAL at 09:06

## 2022-06-08 RX ADMIN — ENOXAPARIN SODIUM 40 MG: 40 INJECTION SUBCUTANEOUS at 05:06

## 2022-06-08 RX ADMIN — SODIUM BICARBONATE: 84 INJECTION, SOLUTION INTRAVENOUS at 06:06

## 2022-06-08 RX ADMIN — POTASSIUM CHLORIDE 40 MEQ: 1500 TABLET, EXTENDED RELEASE ORAL at 08:06

## 2022-06-08 RX ADMIN — DIPHENOXYLATE HYDROCHLORIDE AND ATROPINE SULFATE 2 TABLET: 2.5; .025 TABLET ORAL at 05:06

## 2022-06-08 RX ADMIN — DIPHENOXYLATE HYDROCHLORIDE AND ATROPINE SULFATE 2 TABLET: 2.5; .025 TABLET ORAL at 01:06

## 2022-06-08 RX ADMIN — LOPERAMIDE HYDROCHLORIDE 4 MG: 2 CAPSULE ORAL at 08:06

## 2022-06-08 RX ADMIN — LOPERAMIDE HYDROCHLORIDE 2 MG: 2 CAPSULE ORAL at 06:06

## 2022-06-08 RX ADMIN — ONDANSETRON 4 MG: 2 INJECTION INTRAMUSCULAR; INTRAVENOUS at 08:06

## 2022-06-08 RX ADMIN — LOPERAMIDE HYDROCHLORIDE 4 MG: 2 CAPSULE ORAL at 01:06

## 2022-06-08 RX ADMIN — DIPHENOXYLATE HYDROCHLORIDE AND ATROPINE SULFATE 2 TABLET: 2.5; .025 TABLET ORAL at 08:06

## 2022-06-09 LAB
ALBUMIN SERPL-MCNC: 3.6 GM/DL (ref 3.5–5)
ALBUMIN/GLOB SERPL: 1 RATIO (ref 1.1–2)
ALP SERPL-CCNC: 114 UNIT/L (ref 40–150)
ALT SERPL-CCNC: 52 UNIT/L (ref 0–55)
AST SERPL-CCNC: 49 UNIT/L (ref 5–34)
BASOPHILS # BLD AUTO: 0.05 X10(3)/MCL (ref 0–0.2)
BASOPHILS NFR BLD AUTO: 1 %
BILIRUBIN DIRECT+TOT PNL SERPL-MCNC: 0.5 MG/DL
BUN SERPL-MCNC: 8.3 MG/DL (ref 9.8–20.1)
CALCIUM SERPL-MCNC: 9.5 MG/DL (ref 8.4–10.2)
CHLORIDE SERPL-SCNC: 107 MMOL/L (ref 98–107)
CLOSTRIDIUM DIFFICILE GDH ANTIGEN (OHS): NEGATIVE
CLOSTRIDIUM DIFFICILE TOXIN A/B (OHS): NEGATIVE
CO2 SERPL-SCNC: 22 MMOL/L (ref 22–29)
CREAT SERPL-MCNC: 0.72 MG/DL (ref 0.55–1.02)
EOSINOPHIL # BLD AUTO: 0.18 X10(3)/MCL (ref 0–0.9)
EOSINOPHIL NFR BLD AUTO: 3.4 %
ERYTHROCYTE [DISTWIDTH] IN BLOOD BY AUTOMATED COUNT: 14.8 % (ref 11.5–17)
GLOBULIN SER-MCNC: 3.6 GM/DL (ref 2.4–3.5)
GLUCOSE SERPL-MCNC: 102 MG/DL (ref 74–100)
HCT VFR BLD AUTO: 40.2 % (ref 37–47)
HGB BLD-MCNC: 13.7 GM/DL (ref 12–16)
IMM GRANULOCYTES # BLD AUTO: 0.01 X10(3)/MCL (ref 0–0.02)
IMM GRANULOCYTES NFR BLD AUTO: 0.2 % (ref 0–0.43)
LYMPHOCYTES # BLD AUTO: 1.52 X10(3)/MCL (ref 0.6–4.6)
LYMPHOCYTES NFR BLD AUTO: 29.1 %
MAGNESIUM SERPL-MCNC: 1.9 MG/DL (ref 1.6–2.6)
MCH RBC QN AUTO: 30 PG (ref 27–31)
MCHC RBC AUTO-ENTMCNC: 34.1 MG/DL (ref 33–36)
MCV RBC AUTO: 88 FL (ref 80–94)
MONOCYTES # BLD AUTO: 0.6 X10(3)/MCL (ref 0.1–1.3)
MONOCYTES NFR BLD AUTO: 11.5 %
NEUTROPHILS # BLD AUTO: 2.9 X10(3)/MCL (ref 2.1–9.2)
NEUTROPHILS NFR BLD AUTO: 54.8 %
NRBC BLD AUTO-RTO: 0 %
PLATELET # BLD AUTO: 181 X10(3)/MCL (ref 130–400)
PMV BLD AUTO: 11.1 FL (ref 9.4–12.4)
POTASSIUM SERPL-SCNC: 2.5 MMOL/L (ref 3.5–5.1)
PROT SERPL-MCNC: 7.2 GM/DL (ref 6.4–8.3)
RBC # BLD AUTO: 4.57 X10(6)/MCL (ref 4.2–5.4)
SODIUM SERPL-SCNC: 142 MMOL/L (ref 136–145)
WBC # SPEC AUTO: 5.2 X10(3)/MCL (ref 4.5–11.5)

## 2022-06-09 PROCEDURE — 80053 COMPREHEN METABOLIC PANEL: CPT | Performed by: STUDENT IN AN ORGANIZED HEALTH CARE EDUCATION/TRAINING PROGRAM

## 2022-06-09 PROCEDURE — 11000001 HC ACUTE MED/SURG PRIVATE ROOM

## 2022-06-09 PROCEDURE — 87045 FECES CULTURE AEROBIC BACT: CPT | Performed by: NURSE PRACTITIONER

## 2022-06-09 PROCEDURE — 36415 COLL VENOUS BLD VENIPUNCTURE: CPT | Performed by: STUDENT IN AN ORGANIZED HEALTH CARE EDUCATION/TRAINING PROGRAM

## 2022-06-09 PROCEDURE — 86318 IA INFECTIOUS AGENT ANTIBODY: CPT | Performed by: NURSE PRACTITIONER

## 2022-06-09 PROCEDURE — 25000003 PHARM REV CODE 250: Performed by: INTERNAL MEDICINE

## 2022-06-09 PROCEDURE — 63600175 PHARM REV CODE 636 W HCPCS: Performed by: PHYSICIAN ASSISTANT

## 2022-06-09 PROCEDURE — 27000207 HC ISOLATION

## 2022-06-09 PROCEDURE — 87077 CULTURE AEROBIC IDENTIFY: CPT | Performed by: NURSE PRACTITIONER

## 2022-06-09 PROCEDURE — 63600175 PHARM REV CODE 636 W HCPCS: Performed by: INTERNAL MEDICINE

## 2022-06-09 PROCEDURE — 63600175 PHARM REV CODE 636 W HCPCS: Performed by: NURSE PRACTITIONER

## 2022-06-09 PROCEDURE — 83735 ASSAY OF MAGNESIUM: CPT | Performed by: STUDENT IN AN ORGANIZED HEALTH CARE EDUCATION/TRAINING PROGRAM

## 2022-06-09 PROCEDURE — 25000003 PHARM REV CODE 250: Performed by: PHYSICIAN ASSISTANT

## 2022-06-09 PROCEDURE — 25000003 PHARM REV CODE 250: Performed by: STUDENT IN AN ORGANIZED HEALTH CARE EDUCATION/TRAINING PROGRAM

## 2022-06-09 PROCEDURE — C1751 CATH, INF, PER/CENT/MIDLINE: HCPCS

## 2022-06-09 PROCEDURE — 85025 COMPLETE CBC W/AUTO DIFF WBC: CPT | Performed by: STUDENT IN AN ORGANIZED HEALTH CARE EDUCATION/TRAINING PROGRAM

## 2022-06-09 PROCEDURE — 36410 VNPNXR 3YR/> PHY/QHP DX/THER: CPT

## 2022-06-09 RX ORDER — SODIUM CHLORIDE AND POTASSIUM CHLORIDE 300; 900 MG/100ML; MG/100ML
INJECTION, SOLUTION INTRAVENOUS ONCE
Status: DISCONTINUED | OUTPATIENT
Start: 2022-06-09 | End: 2022-06-09

## 2022-06-09 RX ORDER — MAGNESIUM SULFATE HEPTAHYDRATE 40 MG/ML
2 INJECTION, SOLUTION INTRAVENOUS ONCE
Status: COMPLETED | OUTPATIENT
Start: 2022-06-09 | End: 2022-06-09

## 2022-06-09 RX ORDER — POTASSIUM CHLORIDE 14.9 MG/ML
20 INJECTION INTRAVENOUS ONCE
Status: COMPLETED | OUTPATIENT
Start: 2022-06-09 | End: 2022-06-09

## 2022-06-09 RX ORDER — SODIUM CHLORIDE 0.9 % (FLUSH) 0.9 %
10 SYRINGE (ML) INJECTION EVERY 6 HOURS
Status: DISCONTINUED | OUTPATIENT
Start: 2022-06-10 | End: 2022-06-13 | Stop reason: HOSPADM

## 2022-06-09 RX ORDER — SODIUM CHLORIDE 0.9 % (FLUSH) 0.9 %
10 SYRINGE (ML) INJECTION
Status: DISCONTINUED | OUTPATIENT
Start: 2022-06-09 | End: 2022-06-13 | Stop reason: HOSPADM

## 2022-06-09 RX ORDER — SODIUM CHLORIDE 450 MG/100ML
INJECTION, SOLUTION INTRAVENOUS CONTINUOUS
Status: DISCONTINUED | OUTPATIENT
Start: 2022-06-09 | End: 2022-06-12

## 2022-06-09 RX ADMIN — POTASSIUM CHLORIDE 20 MEQ: 14.9 INJECTION, SOLUTION INTRAVENOUS at 12:06

## 2022-06-09 RX ADMIN — ONDANSETRON 4 MG: 2 INJECTION INTRAMUSCULAR; INTRAVENOUS at 08:06

## 2022-06-09 RX ADMIN — ONDANSETRON 4 MG: 2 INJECTION INTRAMUSCULAR; INTRAVENOUS at 06:06

## 2022-06-09 RX ADMIN — MAGNESIUM SULFATE IN WATER 2 G: 40 INJECTION, SOLUTION INTRAVENOUS at 01:06

## 2022-06-09 RX ADMIN — ACETAMINOPHEN 650 MG: 325 TABLET ORAL at 03:06

## 2022-06-09 RX ADMIN — POTASSIUM CHLORIDE 40 MEQ: 1500 TABLET, EXTENDED RELEASE ORAL at 07:06

## 2022-06-09 RX ADMIN — ONDANSETRON 4 MG: 2 INJECTION INTRAMUSCULAR; INTRAVENOUS at 12:06

## 2022-06-09 RX ADMIN — LOPERAMIDE HYDROCHLORIDE 4 MG: 2 CAPSULE ORAL at 07:06

## 2022-06-09 RX ADMIN — ACETAMINOPHEN 650 MG: 325 TABLET ORAL at 12:06

## 2022-06-09 RX ADMIN — DIPHENOXYLATE HYDROCHLORIDE AND ATROPINE SULFATE 2 TABLET: 2.5; .025 TABLET ORAL at 07:06

## 2022-06-09 RX ADMIN — DIPHENOXYLATE HYDROCHLORIDE AND ATROPINE SULFATE 2 TABLET: 2.5; .025 TABLET ORAL at 08:06

## 2022-06-09 RX ADMIN — ENOXAPARIN SODIUM 40 MG: 40 INJECTION SUBCUTANEOUS at 05:06

## 2022-06-09 RX ADMIN — SODIUM CHLORIDE: 4.5 INJECTION, SOLUTION INTRAVENOUS at 01:06

## 2022-06-09 RX ADMIN — LOPERAMIDE HYDROCHLORIDE 4 MG: 2 CAPSULE ORAL at 05:06

## 2022-06-09 RX ADMIN — POTASSIUM CHLORIDE 20 MEQ: 14.9 INJECTION, SOLUTION INTRAVENOUS at 06:06

## 2022-06-09 RX ADMIN — ONDANSETRON 4 MG: 2 INJECTION INTRAMUSCULAR; INTRAVENOUS at 11:06

## 2022-06-09 RX ADMIN — LOPERAMIDE HYDROCHLORIDE 4 MG: 2 CAPSULE ORAL at 08:06

## 2022-06-09 RX ADMIN — DIPHENOXYLATE HYDROCHLORIDE AND ATROPINE SULFATE 2 TABLET: 2.5; .025 TABLET ORAL at 01:06

## 2022-06-09 RX ADMIN — ONDANSETRON 4 MG: 2 INJECTION INTRAMUSCULAR; INTRAVENOUS at 03:06

## 2022-06-09 RX ADMIN — LOPERAMIDE HYDROCHLORIDE 4 MG: 2 CAPSULE ORAL at 01:06

## 2022-06-09 RX ADMIN — DIPHENOXYLATE HYDROCHLORIDE AND ATROPINE SULFATE 2 TABLET: 2.5; .025 TABLET ORAL at 05:06

## 2022-06-09 NOTE — CONSULTS
"The documentation recorded by the scribe/NP accurately reflects the service I personally performed and the decisions made by me. Will check stool studies for the diarrhea but strongly rec consulting bariatric surgeon given the recent surgery and possible need for reversal. Also they will have more experience in managing the complications of the surgery.       Solomon Cortez MD  [facility]    Gastroenterology Consultation Note    Reason for Consult:  Diagnoses of Acute kidney injury and Tachycardia were pertinent to this visit.    PCP:   Bonnie Bella MD    Referring MD:  Seun Ward MD    Hospital Day: 3     Initial History of Present Illness (HPI): 06/06/22  Zoe Randall is a 51 y.o. White female with a past medical history of gastric sleeve 2017 and duodenal switch on April 5th, 2022 in [place], [place] with Dr. Franck Choi. The patient presented to [facility] on 6/6/2022 with a primary complaint of generalized abdominal cramping, vomiting and diarrhea which began 6/3/2022 after eating chicken cracklin. Patient reports she was started on a full regular diet on week 8 and has been tolerating other foods well. At start of symptoms patient was having 2-3 episodes of diarrhea every hour but over the last day once every 3 hours. She states vomiting began 6/5/2022.  She reports associated symptoms of nausea, headache and muscle cramps.  She denies complaints of fever, chest pain, shortness a breath and sick contacts. She reports an approximate 20 pound weight loss since onset of symptoms.     06/09/22:  We are consulted for hypokalemia d/t diarrhea.  Patient states she had 19 episodes of diarrhea yesterday. She goes about once an hour. It is completely liquid with some mucus, and she endorses that it is "neon colored".  Patient experiences some nausea with eating. She eats a small amount of purees and liquids at each meal. She states that IV Zofran helps with the nausea but she is still unable to eat " "much.    Has been seen in remote past by Neha Calzada MD, in our practice.  Last EGD 2014: previous surgery seen in stomach.  Last colonoscopy 2016: aphthous erosions in terminal ileum, probably NSAID induced. Grade 2 internal hemorrhoids.    ROS:  Review of Systems   Constitutional: Positive for weight loss. Negative for chills and fever.   Gastrointestinal: Positive for diarrhea and nausea. Negative for abdominal pain and blood in stool.   All other systems reviewed and are negative.      Medical History:   GERD    Surgical History:   Lap band? Removed by Dr. Powell  Gastric sleeve 2017  Duodenal switch 04/05/22 [[employer] - Dr. Franck Choi]    Family History:   No family history on file..     Social History:   Social History     Tobacco Use    Smoking status: Not on file    Smokeless tobacco: Not on file   Substance Use Topics    Alcohol use: Not on file       Allergies:  Review of patient's allergies indicates:  No Known Allergies    No medications prior to admission.         Objective Findings:    Vital Signs:  /73   Pulse 63   Temp 97.5 °F (36.4 °C) (Oral)   Resp 16   Ht 5' 5" (1.651 m)   Wt 118.4 kg (261 lb)   SpO2 99%   BMI 43.43 kg/m²   Body mass index is 43.43 kg/m².    Physical Exam:  Physical Exam  Constitutional:       General: She is not in acute distress.     Appearance: She is obese. She is not toxic-appearing.   HENT:      Head: Normocephalic and atraumatic.   Eyes:      Conjunctiva/sclera: Conjunctivae normal.   Cardiovascular:      Rate and Rhythm: Normal rate and regular rhythm.      Heart sounds: Normal heart sounds.   Pulmonary:      Effort: Pulmonary effort is normal.      Breath sounds: Normal breath sounds.   Abdominal:      Tenderness: There is no abdominal tenderness. There is no guarding.      Comments: Hyperactive bowel sounds   Musculoskeletal:         General: Normal range of motion.      Cervical back: Normal range of motion.   Skin:     General: Skin is warm and dry. "   Neurological:      General: No focal deficit present.      Mental Status: She is alert.   Psychiatric:         Mood and Affect: Mood normal.         Behavior: Behavior normal.         Thought Content: Thought content normal.         Judgment: Judgment normal.         Labs:  Recent Results (from the past 48 hour(s))   Comprehensive Metabolic Panel    Collection Time: 06/08/22  8:27 AM   Result Value Ref Range    Sodium Level 142 136 - 145 mmol/L    Potassium Level 3.1 (L) 3.5 - 5.1 mmol/L    Chloride 104 98 - 107 mmol/L    Carbon Dioxide 27 22 - 29 mmol/L    Glucose Level 112 (H) 74 - 100 mg/dL    Blood Urea Nitrogen 8.5 (L) 9.8 - 20.1 mg/dL    Creatinine 0.79 0.55 - 1.02 mg/dL    Calcium Level Total 9.7 8.4 - 10.2 mg/dL    Protein Total 7.4 6.4 - 8.3 gm/dL    Albumin Level 4.1 3.5 - 5.0 gm/dL    Globulin 3.3 2.4 - 3.5 gm/dL    Albumin/Globulin Ratio 1.2 1.1 - 2.0 ratio    Bilirubin Total 0.7 <=1.5 mg/dL    Alkaline Phosphatase 132 40 - 150 unit/L    Alanine Aminotransferase 55 0 - 55 unit/L    Aspartate Aminotransferase 40 (H) 5 - 34 unit/L    Estimated GFR-Non  >60 mls/min/1.73/m2   Magnesium    Collection Time: 06/09/22  4:06 AM   Result Value Ref Range    Magnesium Level 1.90 1.60 - 2.60 mg/dL   Comprehensive Metabolic Panel    Collection Time: 06/09/22  4:06 AM   Result Value Ref Range    Sodium Level 142 136 - 145 mmol/L    Potassium Level 2.5 (LL) 3.5 - 5.1 mmol/L    Chloride 107 98 - 107 mmol/L    Carbon Dioxide 22 22 - 29 mmol/L    Glucose Level 102 (H) 74 - 100 mg/dL    Blood Urea Nitrogen 8.3 (L) 9.8 - 20.1 mg/dL    Creatinine 0.72 0.55 - 1.02 mg/dL    Calcium Level Total 9.5 8.4 - 10.2 mg/dL    Protein Total 7.2 6.4 - 8.3 gm/dL    Albumin Level 3.6 3.5 - 5.0 gm/dL    Globulin 3.6 (H) 2.4 - 3.5 gm/dL    Albumin/Globulin Ratio 1.0 (L) 1.1 - 2.0 ratio    Bilirubin Total 0.5 <=1.5 mg/dL    Alkaline Phosphatase 114 40 - 150 unit/L    Alanine Aminotransferase 52 0 - 55 unit/L    Aspartate  Aminotransferase 49 (H) 5 - 34 unit/L    Estimated GFR-Non  >60 mls/min/1.73/m2   CBC with Differential    Collection Time: 06/09/22  4:06 AM   Result Value Ref Range    WBC 5.2 4.5 - 11.5 x10(3)/mcL    RBC 4.57 4.20 - 5.40 x10(6)/mcL    Hgb 13.7 12.0 - 16.0 gm/dL    Hct 40.2 37.0 - 47.0 %    MCV 88.0 80.0 - 94.0 fL    MCH 30.0 27.0 - 31.0 pg    MCHC 34.1 33.0 - 36.0 mg/dL    RDW 14.8 11.5 - 17.0 %    Platelet 181 130 - 400 x10(3)/mcL    MPV 11.1 9.4 - 12.4 fL    Neut % 54.8 %    Lymph % 29.1 %    Mono % 11.5 %    Eos % 3.4 %    Basophil % 1.0 %    Lymph # 1.52 0.6 - 4.6 x10(3)/mcL    Neut # 2.9 2.1 - 9.2 x10(3)/mcL    Mono # 0.60 0.1 - 1.3 x10(3)/mcL    Eos # 0.18 0 - 0.9 x10(3)/mcL    Baso # 0.05 0 - 0.2 x10(3)/mcL    IG# 0.01 0 - 0.0155 x10(3)/mcL    IG% 0.2 0 - 0.43 %    NRBC% 0.0 %       No orders to display       Imaging:  CT Abd/Pel with contrast 04/22/22:  Impression  No acute abnormality identified within the abdomen and pelvis.  Postsurgical changes of the stomach are noted with no acute  abnormality.    Assessment/Plan:  S/p duodenal switch 04/05/22   Consult bariatric surgery, bariatric dietician to manage post op complications - patient requesting Dr. Powell but informed that he is out of town  Hypokalemia d/t diarrhea   Order stool samples including C diff panel    Thank you for allowing us to participate in the care of Zoe Randall.    Elana Pelletier, NP, DBS acting as scribe for Solomon Cortez MD  Gastroenterology  [facility] 8th floor

## 2022-06-09 NOTE — PROGRESS NOTES
[facility] Hospital Medicine Progress Note        Chief Complaint: Inpatient Follow-up for Acute diarrhea    HPI:   Zoe Randall is a 51 y.o. White female with a past medical history of gastric sleeve 2017 and duodenal switch on April 5th, 2022 in [place], [place] with Dr. Franck Choi. The patient presented to [facility] on 6/6/2022 with a primary complaint of generalized abdominal cramping, vomiting and diarrhea which began 6/3/2022 after eating chicken crackling. Patient reports she was started on a full regular diet on week 8 and has been tolerating other foods well. At start of symptoms patient was having 2-3 episodes of diarrhea every hour but over the last day once every 3 hours. She states vomiting began yesterday (6/5/2022).  She reports associated symptoms of nausea, headache and muscle cramps.  She denies complaints of fever, chest pain, shortness a breath and sick contacts. She reports an approximate 20 pound weight loss since onset of symptoms.       Upon presentation to the ED, heart rate 124 and hemodynamically stable. Labs notable for H&H 16.5/50.6, WBC 7.6, chloride 111, CO2 12, BUN/creatinine 19.7/1.86, glucose 140, alkaline phosphatase 159, AST 48 and ALT 62. In the ED patient received Thiamine and Zofran. Patient is admitted to hospital medicine services for further medical management. CT abd was unremarkable. She was started on IVF, Imodium and lomotil.    Interval Hx:     Afebrile and hemodynamically stable overnight.  Seen and examined at bedside.  Comfortable resting.  Continues to have multiple loose stools with no hematochezia or melena.  Requiring Lomotil or Imodium around-the-clock.  Reports nausea with no vomiting.  Morning labs revealed hypokalemia, hypomagnesemia,.  Stool cultures were not sent.    Objective/physical exam:  Vitals:    06/08/22 2347 06/09/22 0403 06/09/22 0810 06/09/22 1142   BP: 92/63 100/65 107/69 131/68   Pulse: 80 74 78 74   Resp:        Temp: 97.7 °F (36.5 °C) 97.7 °F (36.5 °C) 97.7 °F (36.5 °C) 97.8 °F (36.6 °C)   TempSrc: Oral  Oral Oral   SpO2: (!) 94% 95% 99% 96%   Weight:       Height:          General: In no acute distress, afebrile  Respiratory: Clear to auscultation bilaterally  Cardiovascular: S1, S2, no appreciable murmur  Abdomen: Soft, nontender, BS +  MSK: Warm, no lower extremity edema, no clubbing or cyanosis  Neurologic: Alert and oriented x4, moving all extremities with good strength       Lab Results   Component Value Date     06/09/2022    K 2.5 (LL) 06/09/2022    CO2 22 06/09/2022    BUN 8.3 (L) 06/09/2022    CREATININE 0.72 06/09/2022    CALCIUM 9.5 06/09/2022    EGFRNONAA >60 06/09/2022      Lab Results   Component Value Date    ALT 52 06/09/2022    AST 49 (H) 06/09/2022    ALKPHOS 114 06/09/2022    BILITOT 0.5 06/09/2022      Lab Results   Component Value Date    WBC 5.2 06/09/2022    HGB 13.7 06/09/2022    HCT 40.2 06/09/2022    MCV 88.0 06/09/2022     06/09/2022        Scheduled Med:   amitriptyline  75 mg Oral QHS    atenoloL  100 mg Oral Daily    atorvastatin  80 mg Oral QHS    bisacodyL  10 mg Rectal Daily    ceFAZolin (ANCEF) IVPB  2 g Intravenous Q8H    [START ON 5/8/2022] enoxaparin  40 mg Subcutaneous Daily    gabapentin  600 mg Oral TID    hydrOXYzine HCL  25 mg Oral TID    LIDOcaine (PF) 10 mg/ml (1%)  1 mL Intradermal Once    lisinopriL  5 mg Oral Daily    metFORMIN  1,000 mg Oral BID WM    methocarbamoL  750 mg Oral TID    senna-docusate 8.6-50 mg  2 tablet Oral BID    terbinafine HCL  250 mg Oral Daily    triamterene-hydrochlorothiazide 37.5-25 mg  2 tablet Oral Daily    Continuous Infusions:   sodium chloride 0.9% 100 mL/hr at 05/07/22 0646    electrolyte-A      PRN Meds:  acetaminophen, acetaminophen, aluminum-magnesium hydroxide-simethicone, calcium carbonate, ceFAZolin 2 g/50mL Dextrose IVPB, dextrose 10%, dextrose 50%, dextrose 50%, glucagon (human recombinant), glucose,  glucose, HYDROcodone-acetaminophen, HYDROcodone-acetaminophen, HYDROcodone-acetaminophen, HYDROmorphone, insulin aspart U-100, methocarbamoL, midazolam, morphine, morphine, ondansetron, ondansetron, prochlorperazine, sodium chloride 0.9%, sodium chloride 0.9%       Assessment/Plan:    Acute diarrhea-?  Malabsorption, postsurgical-less likely infectious  Severe hypokalemia, hypomagnesemia due to above  NAGMA due to above-improving  Reactive leukocytosis due to above  TRINH due to dehydration-improving    Morbid obesity s/p gastric sleeve and duodenal switch/5/22    Plan:  - Continue IV hydration.  Switch to half-normal saline.  Discontinue bicarb  - Advance diet as tolerated.  Consult nutrition.  Also get GI evaluation for further input.  Continue vitamin supplementation  - Repeat potassium magnesium.  Follow-up labs closely.  Check phosphorus tomorrow  - Encourage ambulation as tolerated      Sylvester Enrique MD

## 2022-06-10 LAB
ALBUMIN SERPL-MCNC: 3.5 GM/DL (ref 3.5–5)
ALBUMIN/GLOB SERPL: 1.3 RATIO (ref 1.1–2)
ALP SERPL-CCNC: 113 UNIT/L (ref 40–150)
ALT SERPL-CCNC: 73 UNIT/L (ref 0–55)
AST SERPL-CCNC: 62 UNIT/L (ref 5–34)
BASOPHILS # BLD AUTO: 0.05 X10(3)/MCL (ref 0–0.2)
BASOPHILS NFR BLD AUTO: 1.1 %
BILIRUBIN DIRECT+TOT PNL SERPL-MCNC: 0.5 MG/DL
BUN SERPL-MCNC: 5.5 MG/DL (ref 9.8–20.1)
CALCIUM SERPL-MCNC: 9 MG/DL (ref 8.4–10.2)
CHLORIDE SERPL-SCNC: 107 MMOL/L (ref 98–107)
CO2 SERPL-SCNC: 23 MMOL/L (ref 22–29)
CREAT SERPL-MCNC: 0.69 MG/DL (ref 0.55–1.02)
DEPRECATED CALCIDIOL+CALCIFEROL SERPL-MC: 21.3 NG/ML (ref 30–80)
EOSINOPHIL # BLD AUTO: 0.2 X10(3)/MCL (ref 0–0.9)
EOSINOPHIL NFR BLD AUTO: 4.4 %
ERYTHROCYTE [DISTWIDTH] IN BLOOD BY AUTOMATED COUNT: 15.6 % (ref 11.5–17)
GLOBULIN SER-MCNC: 2.8 GM/DL (ref 2.4–3.5)
GLUCOSE SERPL-MCNC: 87 MG/DL (ref 74–100)
HCT VFR BLD AUTO: 37.5 % (ref 37–47)
HGB BLD-MCNC: 12.2 GM/DL (ref 12–16)
IMM GRANULOCYTES # BLD AUTO: 0.01 X10(3)/MCL (ref 0–0.02)
IMM GRANULOCYTES NFR BLD AUTO: 0.2 % (ref 0–0.43)
LYMPHOCYTES # BLD AUTO: 1.77 X10(3)/MCL (ref 0.6–4.6)
LYMPHOCYTES NFR BLD AUTO: 38.9 %
MAGNESIUM SERPL-MCNC: 2.2 MG/DL (ref 1.6–2.6)
MCH RBC QN AUTO: 29.8 PG (ref 27–31)
MCHC RBC AUTO-ENTMCNC: 32.5 MG/DL (ref 33–36)
MCV RBC AUTO: 91.5 FL (ref 80–94)
MONOCYTES # BLD AUTO: 0.33 X10(3)/MCL (ref 0.1–1.3)
MONOCYTES NFR BLD AUTO: 7.3 %
NEUTROPHILS # BLD AUTO: 2.2 X10(3)/MCL (ref 2.1–9.2)
NEUTROPHILS NFR BLD AUTO: 48.1 %
NRBC BLD AUTO-RTO: 0 %
PHOSPHATE SERPL-MCNC: 4.6 MG/DL (ref 2.3–4.7)
PLATELET # BLD AUTO: 177 X10(3)/MCL (ref 130–400)
PMV BLD AUTO: 11.3 FL (ref 9.4–12.4)
POTASSIUM SERPL-SCNC: 2.4 MMOL/L (ref 3.5–5.1)
PROT SERPL-MCNC: 6.3 GM/DL (ref 6.4–8.3)
RBC # BLD AUTO: 4.1 X10(6)/MCL (ref 4.2–5.4)
SODIUM SERPL-SCNC: 140 MMOL/L (ref 136–145)
WBC # SPEC AUTO: 4.6 X10(3)/MCL (ref 4.5–11.5)

## 2022-06-10 PROCEDURE — 36415 COLL VENOUS BLD VENIPUNCTURE: CPT | Performed by: INTERNAL MEDICINE

## 2022-06-10 PROCEDURE — 80053 COMPREHEN METABOLIC PANEL: CPT | Performed by: INTERNAL MEDICINE

## 2022-06-10 PROCEDURE — 25000003 PHARM REV CODE 250: Performed by: STUDENT IN AN ORGANIZED HEALTH CARE EDUCATION/TRAINING PROGRAM

## 2022-06-10 PROCEDURE — 83735 ASSAY OF MAGNESIUM: CPT | Performed by: INTERNAL MEDICINE

## 2022-06-10 PROCEDURE — 25000003 PHARM REV CODE 250: Performed by: INTERNAL MEDICINE

## 2022-06-10 PROCEDURE — 85025 COMPLETE CBC W/AUTO DIFF WBC: CPT | Performed by: INTERNAL MEDICINE

## 2022-06-10 PROCEDURE — 63600175 PHARM REV CODE 636 W HCPCS: Performed by: PHYSICIAN ASSISTANT

## 2022-06-10 PROCEDURE — 63600175 PHARM REV CODE 636 W HCPCS: Performed by: NURSE PRACTITIONER

## 2022-06-10 PROCEDURE — 82306 VITAMIN D 25 HYDROXY: CPT | Performed by: INTERNAL MEDICINE

## 2022-06-10 PROCEDURE — 27000207 HC ISOLATION

## 2022-06-10 PROCEDURE — A4216 STERILE WATER/SALINE, 10 ML: HCPCS | Performed by: STUDENT IN AN ORGANIZED HEALTH CARE EDUCATION/TRAINING PROGRAM

## 2022-06-10 PROCEDURE — 84100 ASSAY OF PHOSPHORUS: CPT | Performed by: INTERNAL MEDICINE

## 2022-06-10 PROCEDURE — 11000001 HC ACUTE MED/SURG PRIVATE ROOM

## 2022-06-10 RX ORDER — POTASSIUM CHLORIDE 14.9 MG/ML
40 INJECTION INTRAVENOUS ONCE
Status: COMPLETED | OUTPATIENT
Start: 2022-06-10 | End: 2022-06-10

## 2022-06-10 RX ORDER — ERGOCALCIFEROL 1.25 MG/1
50000 CAPSULE ORAL
Status: DISCONTINUED | OUTPATIENT
Start: 2022-06-10 | End: 2022-06-13 | Stop reason: HOSPADM

## 2022-06-10 RX ADMIN — ONDANSETRON 4 MG: 2 INJECTION INTRAMUSCULAR; INTRAVENOUS at 05:06

## 2022-06-10 RX ADMIN — ENOXAPARIN SODIUM 40 MG: 40 INJECTION SUBCUTANEOUS at 05:06

## 2022-06-10 RX ADMIN — DIPHENOXYLATE HYDROCHLORIDE AND ATROPINE SULFATE 2 TABLET: 2.5; .025 TABLET ORAL at 08:06

## 2022-06-10 RX ADMIN — SODIUM CHLORIDE, PRESERVATIVE FREE 10 ML: 5 INJECTION INTRAVENOUS at 12:06

## 2022-06-10 RX ADMIN — ONDANSETRON 4 MG: 2 INJECTION INTRAMUSCULAR; INTRAVENOUS at 09:06

## 2022-06-10 RX ADMIN — SODIUM CHLORIDE, PRESERVATIVE FREE 10 ML: 5 INJECTION INTRAVENOUS at 06:06

## 2022-06-10 RX ADMIN — LOPERAMIDE HYDROCHLORIDE 4 MG: 2 CAPSULE ORAL at 08:06

## 2022-06-10 RX ADMIN — SODIUM CHLORIDE: 4.5 INJECTION, SOLUTION INTRAVENOUS at 08:06

## 2022-06-10 RX ADMIN — DIPHENOXYLATE HYDROCHLORIDE AND ATROPINE SULFATE 2 TABLET: 2.5; .025 TABLET ORAL at 05:06

## 2022-06-10 RX ADMIN — LOPERAMIDE HYDROCHLORIDE 4 MG: 2 CAPSULE ORAL at 01:06

## 2022-06-10 RX ADMIN — DIPHENOXYLATE HYDROCHLORIDE AND ATROPINE SULFATE 2 TABLET: 2.5; .025 TABLET ORAL at 01:06

## 2022-06-10 RX ADMIN — ERGOCALCIFEROL 50000 UNITS: 1.25 CAPSULE ORAL at 08:06

## 2022-06-10 RX ADMIN — LOPERAMIDE HYDROCHLORIDE 4 MG: 2 CAPSULE ORAL at 05:06

## 2022-06-10 RX ADMIN — POTASSIUM CHLORIDE 40 MEQ: 14.9 INJECTION, SOLUTION INTRAVENOUS at 07:06

## 2022-06-10 NOTE — CONSULTS
"Gastroenterology Progress Note    Reason for Consult:  Diagnoses of Acute kidney injury and Tachycardia were pertinent to this visit.    PCP:   Bonnie Bella MD    Referring MD:  Seun Ward MD    Hospital Day: 4     Initial History of Present Illness (HPI): 06/06/22  Zoe Randall is a 51 y.o. White female with a past medical history of gastric sleeve 2017 and duodenal switch on April 5th, 2022 in [place], [place] with Dr. Franck Choi. The patient presented to [facility] on 6/6/2022 with a primary complaint of generalized abdominal cramping, vomiting and diarrhea which began 6/3/2022 after eating chicken cracklin. Patient reports she was started on a full regular diet on week 8 and has been tolerating other foods well. At start of symptoms patient was having 2-3 episodes of diarrhea every hour but over the last day once every 3 hours. She states vomiting began 6/5/2022.  She reports associated symptoms of nausea, headache and muscle cramps.  She denies complaints of fever, chest pain, shortness a breath and sick contacts. She reports an approximate 20 pound weight loss since onset of symptoms.     06/09/22:  We are consulted for hypokalemia d/t diarrhea.  Patient states she had 19 episodes of diarrhea yesterday. She goes about once an hour. It is completely liquid with some mucus, and she endorses that it is "neon colored".  Patient experiences some nausea with eating. She eats a small amount of purees and liquids at each meal. She states that IV Zofran helps with the nausea but she is still unable to eat much.    6-10-22 - pt with no BMs overnight  Several duringthe day  Electrolyte replacement continues  Will start soft diet  C diff negative    Has been seen in remote past by Neha Calzada MD, in our practice.  Last EGD 2014: previous surgery seen in stomach.  Last colonoscopy 2016: aphthous erosions in terminal ileum, probably NSAID induced. Grade 2 internal hemorrhoids.    ROS:  Review of Systems " "  Constitutional: Positive for weight loss. Negative for chills and fever.   Gastrointestinal: Positive for diarrhea and nausea. Negative for abdominal pain and blood in stool.   All other systems reviewed and are negative.      Medical History:   GERD    Surgical History:   Lap band? Removed by Dr. Powell  Gastric sleeve 2017  Duodenal switch 04/05/22 [[place] - Dr. Franck Choi]    Family History:   No family history on file..     Social History:   Social History     Tobacco Use    Smoking status: Not on file    Smokeless tobacco: Not on file   Substance Use Topics    Alcohol use: Not on file       Allergies:  Review of patient's allergies indicates:  No Known Allergies    No medications prior to admission.         Objective Findings:    Vital Signs:  /80 (BP Location: Left arm, Patient Position: Lying)   Pulse 90   Temp 98 °F (36.7 °C) (Oral)   Resp 18   Ht 5' 5" (1.651 m)   Wt 118.4 kg (261 lb)   SpO2 99%   BMI 43.43 kg/m²   Body mass index is 43.43 kg/m².    Physical Exam:  Physical Exam  Constitutional:       General: She is not in acute distress.     Appearance: She is obese. She is not toxic-appearing.   HENT:      Head: Normocephalic and atraumatic.   Eyes:      Conjunctiva/sclera: Conjunctivae normal.   Cardiovascular:      Rate and Rhythm: Normal rate and regular rhythm.      Heart sounds: Normal heart sounds.   Pulmonary:      Effort: Pulmonary effort is normal.      Breath sounds: Normal breath sounds.   Abdominal:      Tenderness: There is no abdominal tenderness. There is no guarding.      Comments: Hyperactive bowel sounds   Musculoskeletal:         General: Normal range of motion.      Cervical back: Normal range of motion.   Skin:     General: Skin is warm and dry.   Neurological:      General: No focal deficit present.      Mental Status: She is alert.   Psychiatric:         Mood and Affect: Mood normal.         Behavior: Behavior normal.         Thought Content: Thought content " normal.         Judgment: Judgment normal.         Labs:  Recent Results (from the past 48 hour(s))   Comprehensive Metabolic Panel    Collection Time: 06/08/22  8:27 AM   Result Value Ref Range    Sodium Level 142 136 - 145 mmol/L    Potassium Level 3.1 (L) 3.5 - 5.1 mmol/L    Chloride 104 98 - 107 mmol/L    Carbon Dioxide 27 22 - 29 mmol/L    Glucose Level 112 (H) 74 - 100 mg/dL    Blood Urea Nitrogen 8.5 (L) 9.8 - 20.1 mg/dL    Creatinine 0.79 0.55 - 1.02 mg/dL    Calcium Level Total 9.7 8.4 - 10.2 mg/dL    Protein Total 7.4 6.4 - 8.3 gm/dL    Albumin Level 4.1 3.5 - 5.0 gm/dL    Globulin 3.3 2.4 - 3.5 gm/dL    Albumin/Globulin Ratio 1.2 1.1 - 2.0 ratio    Bilirubin Total 0.7 <=1.5 mg/dL    Alkaline Phosphatase 132 40 - 150 unit/L    Alanine Aminotransferase 55 0 - 55 unit/L    Aspartate Aminotransferase 40 (H) 5 - 34 unit/L    Estimated GFR-Non  >60 mls/min/1.73/m2   Magnesium    Collection Time: 06/09/22  4:06 AM   Result Value Ref Range    Magnesium Level 1.90 1.60 - 2.60 mg/dL   Comprehensive Metabolic Panel    Collection Time: 06/09/22  4:06 AM   Result Value Ref Range    Sodium Level 142 136 - 145 mmol/L    Potassium Level 2.5 (LL) 3.5 - 5.1 mmol/L    Chloride 107 98 - 107 mmol/L    Carbon Dioxide 22 22 - 29 mmol/L    Glucose Level 102 (H) 74 - 100 mg/dL    Blood Urea Nitrogen 8.3 (L) 9.8 - 20.1 mg/dL    Creatinine 0.72 0.55 - 1.02 mg/dL    Calcium Level Total 9.5 8.4 - 10.2 mg/dL    Protein Total 7.2 6.4 - 8.3 gm/dL    Albumin Level 3.6 3.5 - 5.0 gm/dL    Globulin 3.6 (H) 2.4 - 3.5 gm/dL    Albumin/Globulin Ratio 1.0 (L) 1.1 - 2.0 ratio    Bilirubin Total 0.5 <=1.5 mg/dL    Alkaline Phosphatase 114 40 - 150 unit/L    Alanine Aminotransferase 52 0 - 55 unit/L    Aspartate Aminotransferase 49 (H) 5 - 34 unit/L    Estimated GFR-Non  >60 mls/min/1.73/m2   CBC with Differential    Collection Time: 06/09/22  4:06 AM   Result Value Ref Range    WBC 5.2 4.5 - 11.5 x10(3)/mcL    RBC  4.57 4.20 - 5.40 x10(6)/mcL    Hgb 13.7 12.0 - 16.0 gm/dL    Hct 40.2 37.0 - 47.0 %    MCV 88.0 80.0 - 94.0 fL    MCH 30.0 27.0 - 31.0 pg    MCHC 34.1 33.0 - 36.0 mg/dL    RDW 14.8 11.5 - 17.0 %    Platelet 181 130 - 400 x10(3)/mcL    MPV 11.1 9.4 - 12.4 fL    Neut % 54.8 %    Lymph % 29.1 %    Mono % 11.5 %    Eos % 3.4 %    Basophil % 1.0 %    Lymph # 1.52 0.6 - 4.6 x10(3)/mcL    Neut # 2.9 2.1 - 9.2 x10(3)/mcL    Mono # 0.60 0.1 - 1.3 x10(3)/mcL    Eos # 0.18 0 - 0.9 x10(3)/mcL    Baso # 0.05 0 - 0.2 x10(3)/mcL    IG# 0.01 0 - 0.0155 x10(3)/mcL    IG% 0.2 0 - 0.43 %    NRBC% 0.0 %   Clostridium Diff Toxin, A & B, EIA    Collection Time: 06/09/22  6:42 PM    Specimen: Stool   Result Value Ref Range    Clostridium Difficile GDH Antigen Negative Negative    Clostridium Difficile Toxin A/B Negative Negative   Magnesium    Collection Time: 06/10/22  4:14 AM   Result Value Ref Range    Magnesium Level 2.20 1.60 - 2.60 mg/dL   Comprehensive Metabolic Panel    Collection Time: 06/10/22  4:14 AM   Result Value Ref Range    Sodium Level 140 136 - 145 mmol/L    Potassium Level 2.4 (LL) 3.5 - 5.1 mmol/L    Chloride 107 98 - 107 mmol/L    Carbon Dioxide 23 22 - 29 mmol/L    Glucose Level 87 74 - 100 mg/dL    Blood Urea Nitrogen 5.5 (L) 9.8 - 20.1 mg/dL    Creatinine 0.69 0.55 - 1.02 mg/dL    Calcium Level Total 9.0 8.4 - 10.2 mg/dL    Protein Total 6.3 (L) 6.4 - 8.3 gm/dL    Albumin Level 3.5 3.5 - 5.0 gm/dL    Globulin 2.8 2.4 - 3.5 gm/dL    Albumin/Globulin Ratio 1.3 1.1 - 2.0 ratio    Bilirubin Total 0.5 <=1.5 mg/dL    Alkaline Phosphatase 113 40 - 150 unit/L    Alanine Aminotransferase 73 (H) 0 - 55 unit/L    Aspartate Aminotransferase 62 (H) 5 - 34 unit/L    Estimated GFR-Non  >60 mls/min/1.73/m2   CBC with Differential    Collection Time: 06/10/22  4:14 AM   Result Value Ref Range    WBC 4.6 4.5 - 11.5 x10(3)/mcL    RBC 4.10 (L) 4.20 - 5.40 x10(6)/mcL    Hgb 12.2 12.0 - 16.0 gm/dL    Hct 37.5 37.0 - 47.0  %    MCV 91.5 80.0 - 94.0 fL    MCH 29.8 27.0 - 31.0 pg    MCHC 32.5 (L) 33.0 - 36.0 mg/dL    RDW 15.6 11.5 - 17.0 %    Platelet 177 130 - 400 x10(3)/mcL    MPV 11.3 9.4 - 12.4 fL    Neut % 48.1 %    Lymph % 38.9 %    Mono % 7.3 %    Eos % 4.4 %    Basophil % 1.1 %    Lymph # 1.77 0.6 - 4.6 x10(3)/mcL    Neut # 2.2 2.1 - 9.2 x10(3)/mcL    Mono # 0.33 0.1 - 1.3 x10(3)/mcL    Eos # 0.20 0 - 0.9 x10(3)/mcL    Baso # 0.05 0 - 0.2 x10(3)/mcL    IG# 0.01 0 - 0.0155 x10(3)/mcL    IG% 0.2 0 - 0.43 %    NRBC% 0.0 %   Vitamin D    Collection Time: 06/10/22  4:14 AM   Result Value Ref Range    Vit D 25 OH 21.3 (L) 30.0 - 80.0 ng/mL   Phosphorus    Collection Time: 06/10/22  4:14 AM   Result Value Ref Range    Phosphorus Level 4.6 2.3 - 4.7 mg/dL       No orders to display       Imaging:  CT Abd/Pel with contrast 04/22/22:  Impression  No acute abnormality identified within the abdomen and pelvis.  Postsurgical changes of the stomach are noted with no acute  abnormality.    Assessment/Plan:  S/p duodenal switch 04/05/22   Consult bariatric surgery, bariatric dietician to manage post op complications - patient requesting Dr. Powell but informed that he is out of town  Hypokalemia d/t diarrhea   C diff panel - negative    No nocturnal stooling last night  Advance to soft bariatric diet    Thank you for allowing us to participate in the care of Zoe Randall.  The documentation recorded by the scribe/NP accurately reflects the service I personally performed and the decisions made by me.  Severe acute diarrhea with dehydration, hypokalemia, suspect infectious. Await stools. Replete MD [facility]  Elana Pelletier NP, DBS acting as scribe for Neha Calzada MD  Gastroenterology  [facility] 8th floor

## 2022-06-10 NOTE — PROCEDURES
"Zoe Randall is a 51 y.o. female patient.    Temp: 98 °F (36.7 °C) (06/09/22 1958)  Pulse: 67 (06/09/22 1958)  Resp: 16 (06/08/22 2012)  BP: 137/82 (06/09/22 1958)  SpO2: 97 % (06/09/22 1958)  Weight: 118.4 kg (261 lb) (06/07/22 1251)  Height: 5' 5" (165.1 cm) (06/07/22 1251)    PICC  Date/Time: 6/9/2022 8:30 PM  Performed by: Gera Escobar RN  Consent Done: Yes  Time out: Immediately prior to procedure a time out was called to verify the correct patient, procedure, equipment, support staff and site/side marked as required  Indications: med administration and vascular access  Anesthesia: local infiltration  Local anesthetic: lidocaine 1% without epinephrine  Anesthetic Total (mL): 5  Preparation: skin prepped with ChloraPrep  Skin prep agent dried: skin prep agent completely dried prior to procedure  Sterile barriers: all five maximum sterile barriers used - cap, mask, sterile gown, sterile gloves, and large sterile sheet  Hand hygiene: hand hygiene performed prior to central venous catheter insertion  Location details: left basilic  Catheter type: single lumen  Catheter size: 4 Fr  Catheter Length: 16cm    Ultrasound guidance: yes  Vessel Caliber: medium and patent, compressibility normal  Needle advanced into vessel with real time Ultrasound guidance.  Guidewire confirmed in vessel.  Sterile sheath used.  Number of attempts: 1  Post-procedure: blood return through all ports, chlorhexidine patch and sterile dressing applied            Luanne Escobar  6/9/2022    "

## 2022-06-10 NOTE — PROGRESS NOTES
[facility]  Hospital Medicine Progress Note        Chief Complaint: Inpatient Follow-up for Acute diarrhea    HPI:   Zoe Randall is a 51 y.o. White female with a past medical history of gastric sleeve 2017 and duodenal switch on April 5th, 2022 in [place], [place] with Dr. Franck Choi. The patient presented to [facility] on 6/6/2022 with a primary complaint of generalized abdominal cramping, vomiting and diarrhea which began 6/3/2022 after eating chicken crackling. Patient reports she was started on a full regular diet on week 8 and has been tolerating other foods well. At start of symptoms patient was having 2-3 episodes of diarrhea every hour but over the last day once every 3 hours. She states vomiting began yesterday (6/5/2022).  She reports associated symptoms of nausea, headache and muscle cramps.  She denies complaints of fever, chest pain, shortness a breath and sick contacts. She reports an approximate 20 pound weight loss since onset of symptoms.       Upon presentation to the ED, heart rate 124 and hemodynamically stable. Labs notable for H&H 16.5/50.6, WBC 7.6, chloride 111, CO2 12, BUN/creatinine 19.7/1.86, glucose 140, alkaline phosphatase 159, AST 48 and ALT 62. In the ED patient received Thiamine and Zofran. Patient is admitted to hospital medicine services for further medical management. CT abd was unremarkable. She was started on IVF, Imodium and lomotil.    Interval Hx:   She was afebrile overnight.  Continues to need Imodium and Lomotil.  No bowel movements overnight but continues to have loose stools frequently while she is awake.  When seen and examined at bedside she was alert, comfortably resting.  Denies any vomiting.  Agreeable to advance her diet.  Morning labs revealed hypokalemia, vitamin D deficiency otherwise nothing new remarkable.  CT was negative    Objective/physical exam:  Vitals:    06/09/22 1519 06/09/22 1958 06/10/22 0041 06/10/22 0500   BP: 122/73  137/82 103/70 110/80   BP Location:   Left arm Left arm   Patient Position:   Lying Lying   Pulse: 63 67 69 90   Resp:   18 18   Temp: 97.5 °F (36.4 °C) 98 °F (36.7 °C) 98 °F (36.7 °C) 98 °F (36.7 °C)   TempSrc: Oral Oral Oral Oral   SpO2: 99% 97% 97% 99%   Weight:       Height:          General: In no acute distress, afebrile  Respiratory: Clear to auscultation bilaterally  Cardiovascular: S1, S2, no appreciable murmur  Abdomen: Soft, nontender, BS +  MSK: Warm, no lower extremity edema, no clubbing or cyanosis  Neurologic: Alert and oriented x4, moving all extremities with good strength       Lab Results   Component Value Date     06/10/2022    K 2.4 (LL) 06/10/2022    CO2 23 06/10/2022    BUN 5.5 (L) 06/10/2022    CREATININE 0.69 06/10/2022    CALCIUM 9.0 06/10/2022    EGFRNONAA >60 06/10/2022      Lab Results   Component Value Date    ALT 73 (H) 06/10/2022    AST 62 (H) 06/10/2022    ALKPHOS 113 06/10/2022    BILITOT 0.5 06/10/2022      Lab Results   Component Value Date    WBC 4.6 06/10/2022    HGB 12.2 06/10/2022    HCT 37.5 06/10/2022    MCV 91.5 06/10/2022     06/10/2022        Scheduled Med:   amitriptyline  75 mg Oral QHS    atenoloL  100 mg Oral Daily    atorvastatin  80 mg Oral QHS    bisacodyL  10 mg Rectal Daily    ceFAZolin (ANCEF) IVPB  2 g Intravenous Q8H    [START ON 5/8/2022] enoxaparin  40 mg Subcutaneous Daily    gabapentin  600 mg Oral TID    hydrOXYzine HCL  25 mg Oral TID    LIDOcaine (PF) 10 mg/ml (1%)  1 mL Intradermal Once    lisinopriL  5 mg Oral Daily    metFORMIN  1,000 mg Oral BID WM    methocarbamoL  750 mg Oral TID    senna-docusate 8.6-50 mg  2 tablet Oral BID    terbinafine HCL  250 mg Oral Daily    triamterene-hydrochlorothiazide 37.5-25 mg  2 tablet Oral Daily    Continuous Infusions:   sodium chloride 0.9% 100 mL/hr at 05/07/22 0646    electrolyte-A      PRN Meds:  acetaminophen, acetaminophen, aluminum-magnesium hydroxide-simethicone, calcium  carbonate, ceFAZolin 2 g/50mL Dextrose IVPB, dextrose 10%, dextrose 50%, dextrose 50%, glucagon (human recombinant), glucose, glucose, HYDROcodone-acetaminophen, HYDROcodone-acetaminophen, HYDROcodone-acetaminophen, HYDROmorphone, insulin aspart U-100, methocarbamoL, midazolam, morphine, morphine, ondansetron, ondansetron, prochlorperazine, sodium chloride 0.9%, sodium chloride 0.9%       Assessment/Plan:    Acute diarrhea-?  Malabsorption, postsurgical-less likely infectious  Severe hypokalemia, hypomagnesemia due to above  NAGMA due to above-improving  Reactive leukocytosis due to above  TRINH due to dehydration-improving    Morbid obesity s/p gastric sleeve and duodenal switch/5/22    Plan:  - Stool tested negative for C. difficile.  Pending Gi PCR. GI following now.  Continue as needed Lomotil and Imodium.  - Replete potassium.  Monitor electrolytes  - Encourage oral hydration nutrition.  Advance diet as tolerated.  Continue fluids overnight until p.o. intake is adequate  - Encourage ambulation.  Other appropriate home medications will be reviewed and renewed.  Added vitamin D therapy    Labs for tomorrow  Full code     Sylvester Enrique MD

## 2022-06-10 NOTE — PROGRESS NOTES
"[facility] - 8th Floor Med Surg  Adult Nutrition  Progress Note    SUMMARY       Recommendations    Recommendation/Intervention:   1- Continue current diet as tolerated.     2- Continue to replete potassium as medically feasible.    3- Monitor wt, labs, and intake.     Assessment and Plan  6/7/22: Unable to visit with pt during rounds- will f/u early with pt; pt on clears- noted admitted with n/v.     6/10/22: Pt reports feeling hungry; diet just advanced to solids this morning- tolerance pending. Pt reports some nausea.     Malnutrition Assessment  Malnutrition Type: acute illness or injury, other (see comments) (unable to evaluate at this time)    Reason for Assessment    Reason For Assessment: identified at risk by screening criteria (MST >/=2)  Diagnosis:  Intravascular volume depletion secondary to intractable nausea vomiting  Status post gastric sleeve and duodenal switch on 4/5/2022  NAGMA   Polycythemia  Acute kidney injury  Elevated alkaline phosphatase  Relevant Medical History: gastric sleeve 2017 and duodenal switch on April 5th, 2022    Nutrition Risk Screen    Nutrition Risk Screen: no indicators present    Nutrition/Diet History    Factors Affecting Nutritional Intake: clear liquid diet    Anthropometrics    Temp: 97.7 °F (36.5 °C)  Height Method: Stated  Height: 5' 5" (165.1 cm)  Height (inches): 65 in  Weight Method: Stated  Weight: 118.4 kg (261 lb)  Weight (lb): 261 lb  Ideal Body Weight (IBW), Female: 125 lb  % Ideal Body Weight, Female (lb): 208.8 %  BMI (Calculated): 43.4  BMI Grade: greater than 40 - morbid obesity    Lab/Procedures/Meds    Pertinent Labs Reviewed: reviewed  Pertinent Labs Comments: 6/10: K 2.4, Glu 87, GFR>60  Pertinent Medications Reviewed: reviewed  Pertinent Medications Comments: 1/2NS, Vit D, potassium bicarbonate, Zofran PRN, Kcl      Estimated/Assessed Needs    Weight Used For Calorie Calculations: 118.4 kg (261 lb 0.4 oz)  Energy Calorie Requirements " (kcal): 1980 (MSJ x 1.1 SF)  Energy Need Method: [place]- Emy  Protein Requirements: 131gm (1.1g/kg)  Weight Used For Protein Calculations: 118.4 kg (261 lb 0.4 oz)  Fluid Requirements (mL): 1980  Estimated Fluid Requirement Method: RDA Method  RDA Method (mL): 1980    Nutrition Prescription Ordered    Current Diet Order: Bariatric Soft     Evaluation of Received Nutrient/Fluid Intake    Energy Calories Required: not meeting needs  Protein Required: not meeting needs  Fluid Required: not meeting needs  % Intake of Estimated Energy Needs: 25 - 50 %  % Meal Intake: 50 - 75 %    Nutrition Risk    Level of Risk/Frequency of Follow-up: moderate     Nutrition Problem  Inadequate Energy Intake    Related to (etiology):   Current condition    Signs and Symptoms (as evidenced by):   NPO/clear liquid diet since admit    Interventions(treatment strategy):  Modified composition of meals / snacks, Multivitamin / Mineral supplement therapy and Collaboration with other providers    Nutrition Diagnosis Status:   Continues    Goals: Meet >/=75% est energy needs by f/u  Nutrition Goal Status: progressing toward goal    Monitor and Evaluation    Food and Nutrient Intake: food and beverage intake, energy intake  Anthropometric Measurements: weight  Biochemical Data, Medical Tests and Procedures: electrolyte and renal panel     Nutrition Follow-Up    RD Follow-up?: Yes

## 2022-06-11 LAB
ALBUMIN SERPL-MCNC: 3.4 GM/DL (ref 3.5–5)
ALBUMIN/GLOB SERPL: 1.3 RATIO (ref 1.1–2)
ALP SERPL-CCNC: 119 UNIT/L (ref 40–150)
ALT SERPL-CCNC: 91 UNIT/L (ref 0–55)
AST SERPL-CCNC: 73 UNIT/L (ref 5–34)
BILIRUBIN DIRECT+TOT PNL SERPL-MCNC: 0.6 MG/DL
BUN SERPL-MCNC: 4.6 MG/DL (ref 9.8–20.1)
CALCIUM SERPL-MCNC: 8.7 MG/DL (ref 8.4–10.2)
CHLORIDE SERPL-SCNC: 106 MMOL/L (ref 98–107)
CO2 SERPL-SCNC: 23 MMOL/L (ref 22–29)
CREAT SERPL-MCNC: 0.64 MG/DL (ref 0.55–1.02)
GLOBULIN SER-MCNC: 2.6 GM/DL (ref 2.4–3.5)
GLUCOSE SERPL-MCNC: 80 MG/DL (ref 74–100)
MAGNESIUM SERPL-MCNC: 2.1 MG/DL (ref 1.6–2.6)
POTASSIUM SERPL-SCNC: 2.2 MMOL/L (ref 3.5–5.1)
PROT SERPL-MCNC: 6 GM/DL (ref 6.4–8.3)
SODIUM SERPL-SCNC: 139 MMOL/L (ref 136–145)

## 2022-06-11 PROCEDURE — 25000003 PHARM REV CODE 250: Performed by: STUDENT IN AN ORGANIZED HEALTH CARE EDUCATION/TRAINING PROGRAM

## 2022-06-11 PROCEDURE — 87507 IADNA-DNA/RNA PROBE TQ 12-25: CPT | Performed by: STUDENT IN AN ORGANIZED HEALTH CARE EDUCATION/TRAINING PROGRAM

## 2022-06-11 PROCEDURE — 25000003 PHARM REV CODE 250: Performed by: INTERNAL MEDICINE

## 2022-06-11 PROCEDURE — 83735 ASSAY OF MAGNESIUM: CPT | Performed by: INTERNAL MEDICINE

## 2022-06-11 PROCEDURE — 63600175 PHARM REV CODE 636 W HCPCS: Performed by: INTERNAL MEDICINE

## 2022-06-11 PROCEDURE — 11000001 HC ACUTE MED/SURG PRIVATE ROOM

## 2022-06-11 PROCEDURE — 63600175 PHARM REV CODE 636 W HCPCS: Performed by: NURSE PRACTITIONER

## 2022-06-11 PROCEDURE — 36415 COLL VENOUS BLD VENIPUNCTURE: CPT | Performed by: INTERNAL MEDICINE

## 2022-06-11 PROCEDURE — 27000207 HC ISOLATION

## 2022-06-11 PROCEDURE — 25000003 PHARM REV CODE 250: Performed by: NURSE PRACTITIONER

## 2022-06-11 PROCEDURE — 63600175 PHARM REV CODE 636 W HCPCS: Performed by: PHYSICIAN ASSISTANT

## 2022-06-11 PROCEDURE — A4216 STERILE WATER/SALINE, 10 ML: HCPCS | Performed by: STUDENT IN AN ORGANIZED HEALTH CARE EDUCATION/TRAINING PROGRAM

## 2022-06-11 PROCEDURE — 80053 COMPREHEN METABOLIC PANEL: CPT | Performed by: INTERNAL MEDICINE

## 2022-06-11 RX ORDER — CIPROFLOXACIN 2 MG/ML
400 INJECTION, SOLUTION INTRAVENOUS
Status: COMPLETED | OUTPATIENT
Start: 2022-06-11 | End: 2022-06-12

## 2022-06-11 RX ORDER — POTASSIUM CHLORIDE 14.9 MG/ML
40 INJECTION INTRAVENOUS ONCE
Status: COMPLETED | OUTPATIENT
Start: 2022-06-11 | End: 2022-06-11

## 2022-06-11 RX ORDER — POTASSIUM CHLORIDE 20 MEQ/1
20 TABLET, EXTENDED RELEASE ORAL ONCE
Status: COMPLETED | OUTPATIENT
Start: 2022-06-11 | End: 2022-06-11

## 2022-06-11 RX ORDER — MONTELUKAST SODIUM 4 MG/1
1 TABLET, CHEWABLE ORAL 2 TIMES DAILY
Status: DISCONTINUED | OUTPATIENT
Start: 2022-06-11 | End: 2022-06-13 | Stop reason: HOSPADM

## 2022-06-11 RX ADMIN — CIPROFLOXACIN 400 MG: 2 INJECTION, SOLUTION INTRAVENOUS at 01:06

## 2022-06-11 RX ADMIN — POTASSIUM CHLORIDE 20 MEQ: 14.9 INJECTION, SOLUTION INTRAVENOUS at 07:06

## 2022-06-11 RX ADMIN — SODIUM CHLORIDE, PRESERVATIVE FREE 10 ML: 5 INJECTION INTRAVENOUS at 05:06

## 2022-06-11 RX ADMIN — LOPERAMIDE HYDROCHLORIDE 4 MG: 2 CAPSULE ORAL at 08:06

## 2022-06-11 RX ADMIN — MONTELUKAST SODIUM 1 G: 4 TABLET, CHEWABLE ORAL at 01:06

## 2022-06-11 RX ADMIN — DIPHENOXYLATE HYDROCHLORIDE AND ATROPINE SULFATE 2 TABLET: 2.5; .025 TABLET ORAL at 08:06

## 2022-06-11 RX ADMIN — LOPERAMIDE HYDROCHLORIDE 4 MG: 2 CAPSULE ORAL at 01:06

## 2022-06-11 RX ADMIN — LOPERAMIDE HYDROCHLORIDE 4 MG: 2 CAPSULE ORAL at 05:06

## 2022-06-11 RX ADMIN — DIPHENOXYLATE HYDROCHLORIDE AND ATROPINE SULFATE 2 TABLET: 2.5; .025 TABLET ORAL at 05:06

## 2022-06-11 RX ADMIN — DIPHENOXYLATE HYDROCHLORIDE AND ATROPINE SULFATE 2 TABLET: 2.5; .025 TABLET ORAL at 01:06

## 2022-06-11 RX ADMIN — POTASSIUM CHLORIDE 20 MEQ: 1500 TABLET, EXTENDED RELEASE ORAL at 06:06

## 2022-06-11 RX ADMIN — DIPHENOXYLATE HYDROCHLORIDE AND ATROPINE SULFATE 2 TABLET: 2.5; .025 TABLET ORAL at 09:06

## 2022-06-11 RX ADMIN — MONTELUKAST SODIUM 1 G: 4 TABLET, CHEWABLE ORAL at 08:06

## 2022-06-11 RX ADMIN — POTASSIUM BICARBONATE 50 MEQ: 977.5 TABLET, EFFERVESCENT ORAL at 07:06

## 2022-06-11 RX ADMIN — ENOXAPARIN SODIUM 40 MG: 40 INJECTION SUBCUTANEOUS at 05:06

## 2022-06-11 RX ADMIN — SODIUM CHLORIDE, PRESERVATIVE FREE 10 ML: 5 INJECTION INTRAVENOUS at 01:06

## 2022-06-11 RX ADMIN — LOPERAMIDE HYDROCHLORIDE 4 MG: 2 CAPSULE ORAL at 09:06

## 2022-06-11 NOTE — PROGRESS NOTES
Progress Note  Gastroenterology    Admit Date: 6/6/2022   LOS: 5 days     SUBJECTIVE:     Follow-up For:    Diarrhea slightly better. Tolerating liquids and fruit. No pain or bleedingcheduled Meds:   diphenoxylate-atropine 2.5-0.025 mg  2 tablet Oral QID    enoxaparin  40 mg Subcutaneous Daily    ergocalciferol  50,000 Units Oral Q7 Days    loperamide  4 mg Oral QID    potassium chloride  20 mEq Oral Once    sodium chloride 0.9%  10 mL Intravenous Q6H     Continuous Infusions:   sodium chloride 0.45% 75 mL/hr at 06/10/22 2044     PRN Meds:acetaminophen, acetaminophen, dextrose 10%, dextrose 10%, glucagon (human recombinant), glucose, glucose, ondansetron, sodium chloride 0.9%, Flushing PICC Protocol **AND** sodium chloride 0.9% **AND** sodium chloride 0.9%    Review of patient's allergies indicates:  No Known Allergies      OBJECTIVE:     Vital Signs (Most Recent)  Temp: 97.8 °F (36.6 °C) (06/11/22 1123)  Pulse: (!) 59 (06/11/22 1123)  Resp: 16 (06/10/22 2300)  BP: 111/65 (06/11/22 1123)  SpO2: 98 % (06/11/22 1123)    Temperature Range Min/Max (Last 24H):  Temp:  [97 °F (36.1 °C)-98.2 °F (36.8 °C)]     I & O (Last 24H):    Intake/Output Summary (Last 24 hours) at 6/11/2022 1150  Last data filed at 6/10/2022 1430  Gross per 24 hour   Intake 1420 ml   Output 5 ml   Net 1415 ml     Physical Exam:  Physical Exam  Constitutional:       Appearance: Normal appearance.   HENT:      Head: Normocephalic.      Nose: Nose normal.   Eyes:      Conjunctiva/sclera: Conjunctivae normal.   Cardiovascular:      Rate and Rhythm: Normal rate and regular rhythm.   Pulmonary:      Effort: Pulmonary effort is normal.      Breath sounds: Normal breath sounds.   Abdominal:      General: Abdomen is flat. Bowel sounds are normal.      Palpations: Abdomen is soft.   Skin:     General: Skin is warm and dry.   Neurological:      Mental Status: She is alert and oriented to person, place, and time. Mental status is at baseline.    Psychiatric:         Mood and Affect: Mood normal.          Laboratory:GETLABS#1D    Stool pathogens pending    Diagnostic Results:  CT Abdomen Pelvis With Contrast  Clinical History:  Abdominal Pain     Technique:  Multidetector IV contrast enhanced axial CT images of the abdomen and  pelvis were obtained with coronal and sagittal reconstructions.      Automatic exposure control was utilized to reduce the patient's  radiation dose.     FQV=6161     Comparison:  5/5/2014     Findings:     01. HEPATOBILIARY: No focal hepatic lesion is identified, status post  cholecystectomy.     02. SPLEEN: Normal     03. PANCREAS: No focal masses or ductal dilatation.     04. ADRENALS: No adrenal nodules.     05. KIDNEYS: The right kidney demonstrates no stone, hydronephrosis,  or hydroureter. No focal mass identified.The left kidney demonstrates  no stone, hydronephrosis, or hydroureter. No focal mass identified.     06. LYMPHADENOPATHY/RETROPERITONEUM: There is no retroperitoneal  lymphadenopathy. The abdominal aorta is normal in course and caliber.      07. BOWEL: Postsurgical changes of the stomach. No acute abnormality.     08. PELVIC VISCERA: Intrauterine IUD noted.     09. PELVIC LYMPH NODES: No lymphadenopathy.     10. PERITONEUM/ABDOMINAL WALL: No ascites or implant.     11. SKELETAL: No aggressive appearing lytic/blastic lesion. No acute  fractures, subluxations or dislocations.     12. LUNG BASES: The visualized lungs are unremarkable.     Impression  No acute abnormality identified within the abdomen and pelvis.  Postsurgical changes of the stomach are noted with no acute  abnormality.       Electronically Signed By: Artis Pickard MD  Date/Time Signed: 04/22/2022 18:34       ASSESSMENT/PLAN:     Diarrhea ? Infectious.  Await stool pathogens. Add colestipol    Plan:

## 2022-06-11 NOTE — PROGRESS NOTES
[facility] Hospital Medicine Progress Note        Chief Complaint: Inpatient Follow-up for Acute diarrhea    HPI:   Zoe Randall is a 51 y.o. White female with a past medical history of gastric sleeve 2017 and duodenal switch on April 5th, 2022 in [place], [place] with Dr. Franck Choi. The patient presented to [facility] on 6/6/2022 with a primary complaint of generalized abdominal cramping, vomiting and diarrhea which began 6/3/2022 after eating chicken crackling. Patient reports she was started on a full regular diet on week 8 and has been tolerating other foods well. At start of symptoms patient was having 2-3 episodes of diarrhea every hour but over the last day once every 3 hours. She states vomiting began yesterday (6/5/2022).  She reports associated symptoms of nausea, headache and muscle cramps.  She denies complaints of fever, chest pain, shortness a breath and sick contacts. She reports an approximate 20 pound weight loss since onset of symptoms.       Upon presentation to the ED, heart rate 124 and hemodynamically stable. Labs notable for H&H 16.5/50.6, WBC 7.6, chloride 111, CO2 12, BUN/creatinine 19.7/1.86, glucose 140, alkaline phosphatase 159, AST 48 and ALT 62. In the ED patient received Thiamine and Zofran. Patient is admitted to hospital medicine services for further medical management. CT abd was unremarkable. She was started on IVF, Imodium and lomotil.    Interval Hx:   Afebrile and hemodynamically stable.  Comfortably resting in the bed.  Tolerating soft diet.  Denies any nausea or vomiting.  Diarrhea continues needing as needed Lomotil and Imodium.  Morning labs showed hypokalemia    Objective/physical exam:  Vitals:    06/10/22 2000 06/10/22 2300 06/11/22 0432 06/11/22 0721   BP: 95/61 100/69 116/74 126/79   BP Location: Right arm      Patient Position: Lying      Pulse: 63 60 65 71   Resp: 15 16     Temp: 97.9 °F (36.6 °C) 97 °F (36.1 °C) 97.5 °F (36.4 °C) 98.2 °F  (36.8 °C)   TempSrc: Oral  Oral Oral   SpO2: 99% 97% 96% 98%   Weight:       Height:          General: In no acute distress, afebrile  Respiratory: Clear to auscultation bilaterally  Cardiovascular: S1, S2, no appreciable murmur  Abdomen: Soft, nontender, BS +  MSK: Warm, no lower extremity edema, no clubbing or cyanosis  Neurologic: Alert and oriented x4, moving all extremities with good strength       Lab Results   Component Value Date     06/11/2022    K 2.2 (LL) 06/11/2022    CO2 23 06/11/2022    BUN 4.6 (L) 06/11/2022    CREATININE 0.64 06/11/2022    CALCIUM 8.7 06/11/2022    EGFRNONAA >60 06/11/2022      Lab Results   Component Value Date    ALT 91 (H) 06/11/2022    AST 73 (H) 06/11/2022    ALKPHOS 119 06/11/2022    BILITOT 0.6 06/11/2022      Lab Results   Component Value Date    WBC 4.6 06/10/2022    HGB 12.2 06/10/2022    HCT 37.5 06/10/2022    MCV 91.5 06/10/2022     06/10/2022        Scheduled Med:   amitriptyline  75 mg Oral QHS    atenoloL  100 mg Oral Daily    atorvastatin  80 mg Oral QHS    bisacodyL  10 mg Rectal Daily    ceFAZolin (ANCEF) IVPB  2 g Intravenous Q8H    [START ON 5/8/2022] enoxaparin  40 mg Subcutaneous Daily    gabapentin  600 mg Oral TID    hydrOXYzine HCL  25 mg Oral TID    LIDOcaine (PF) 10 mg/ml (1%)  1 mL Intradermal Once    lisinopriL  5 mg Oral Daily    metFORMIN  1,000 mg Oral BID WM    methocarbamoL  750 mg Oral TID    senna-docusate 8.6-50 mg  2 tablet Oral BID    terbinafine HCL  250 mg Oral Daily    triamterene-hydrochlorothiazide 37.5-25 mg  2 tablet Oral Daily    Continuous Infusions:   sodium chloride 0.9% 100 mL/hr at 05/07/22 0646    electrolyte-A      PRN Meds:  acetaminophen, acetaminophen, aluminum-magnesium hydroxide-simethicone, calcium carbonate, ceFAZolin 2 g/50mL Dextrose IVPB, dextrose 10%, dextrose 50%, dextrose 50%, glucagon (human recombinant), glucose, glucose, HYDROcodone-acetaminophen, HYDROcodone-acetaminophen,  HYDROcodone-acetaminophen, HYDROmorphone, insulin aspart U-100, methocarbamoL, midazolam, morphine, morphine, ondansetron, ondansetron, prochlorperazine, sodium chloride 0.9%, sodium chloride 0.9%       Assessment/Plan:    Acute diarrhea - Vibrio positive culture  Severe hypokalemia, hypomagnesemia due to above  NAGMA due to above-improving  Reactive leukocytosis due to above  TRINH due to dehydration-improving    Morbid obesity s/p gastric sleeve and duodenal switch/5/22    Plan:  - Will add Cipro 400 Iv BID for three days. Can switch to PO eventually.  Stool tested negative for C. difficile.  Pending Gi PCR. GI following now.  Continue as needed Lomotil and Imodium.  - Replete potassium.  Monitor electrolytes  - Encourage oral hydration nutrition.  Advance diet as tolerated.  Continue fluids overnight until p.o. intake is adequate  - Encourage ambulation.  Other appropriate home medications will be reviewed and renewed.  Added vitamin D therapy    Labs for tomorrow  Full code     Sylvester Enrique MD

## 2022-06-11 NOTE — PLAN OF CARE
No acute events over night. Patient reported she was able to sleep 4 hours without having a BM. VSS. NAD noted. Patient safety maintained.      Problem: Adult Inpatient Plan of Care  Goal: Absence of Hospital-Acquired Illness or Injury  Outcome: Ongoing, Progressing     Problem: Adult Inpatient Plan of Care  Goal: Optimal Comfort and Wellbeing  Outcome: Ongoing, Progressing     Problem: Bariatric Environmental Safety  Goal: Safety Maintained with Care  Outcome: Ongoing, Progressing

## 2022-06-12 LAB
ANION GAP SERPL CALC-SCNC: 4 MEQ/L
BUN SERPL-MCNC: 4.4 MG/DL (ref 9.8–20.1)
CALCIUM SERPL-MCNC: 9 MG/DL (ref 8.4–10.2)
CHLORIDE SERPL-SCNC: 108 MMOL/L (ref 98–107)
CO2 SERPL-SCNC: 28 MMOL/L (ref 22–29)
CREAT SERPL-MCNC: 0.64 MG/DL (ref 0.55–1.02)
CREAT/UREA NIT SERPL: 7
GLUCOSE SERPL-MCNC: 86 MG/DL (ref 74–100)
MAGNESIUM SERPL-MCNC: 1.9 MG/DL (ref 1.6–2.6)
POTASSIUM SERPL-SCNC: 2.7 MMOL/L (ref 3.5–5.1)
SODIUM SERPL-SCNC: 140 MMOL/L (ref 136–145)

## 2022-06-12 PROCEDURE — 63600175 PHARM REV CODE 636 W HCPCS: Performed by: NURSE PRACTITIONER

## 2022-06-12 PROCEDURE — A4216 STERILE WATER/SALINE, 10 ML: HCPCS | Performed by: STUDENT IN AN ORGANIZED HEALTH CARE EDUCATION/TRAINING PROGRAM

## 2022-06-12 PROCEDURE — 63600175 PHARM REV CODE 636 W HCPCS: Performed by: PHYSICIAN ASSISTANT

## 2022-06-12 PROCEDURE — 63600175 PHARM REV CODE 636 W HCPCS: Performed by: INTERNAL MEDICINE

## 2022-06-12 PROCEDURE — 27000207 HC ISOLATION

## 2022-06-12 PROCEDURE — 25000003 PHARM REV CODE 250: Performed by: STUDENT IN AN ORGANIZED HEALTH CARE EDUCATION/TRAINING PROGRAM

## 2022-06-12 PROCEDURE — 80048 BASIC METABOLIC PNL TOTAL CA: CPT | Performed by: INTERNAL MEDICINE

## 2022-06-12 PROCEDURE — 83735 ASSAY OF MAGNESIUM: CPT | Performed by: INTERNAL MEDICINE

## 2022-06-12 PROCEDURE — 36415 COLL VENOUS BLD VENIPUNCTURE: CPT | Performed by: INTERNAL MEDICINE

## 2022-06-12 PROCEDURE — 25000003 PHARM REV CODE 250: Performed by: INTERNAL MEDICINE

## 2022-06-12 PROCEDURE — 11000001 HC ACUTE MED/SURG PRIVATE ROOM

## 2022-06-12 RX ORDER — LOPERAMIDE HYDROCHLORIDE 2 MG/1
4 CAPSULE ORAL 4 TIMES DAILY PRN
Status: DISCONTINUED | OUTPATIENT
Start: 2022-06-12 | End: 2022-06-13 | Stop reason: HOSPADM

## 2022-06-12 RX ORDER — POTASSIUM CHLORIDE 20 MEQ/1
40 TABLET, EXTENDED RELEASE ORAL ONCE
Status: COMPLETED | OUTPATIENT
Start: 2022-06-12 | End: 2022-06-12

## 2022-06-12 RX ORDER — POTASSIUM CHLORIDE 14.9 MG/ML
40 INJECTION INTRAVENOUS ONCE
Status: COMPLETED | OUTPATIENT
Start: 2022-06-12 | End: 2022-06-12

## 2022-06-12 RX ADMIN — POTASSIUM CHLORIDE 40 MEQ: 1500 TABLET, EXTENDED RELEASE ORAL at 08:06

## 2022-06-12 RX ADMIN — CIPROFLOXACIN 400 MG: 2 INJECTION, SOLUTION INTRAVENOUS at 01:06

## 2022-06-12 RX ADMIN — LOPERAMIDE HYDROCHLORIDE 4 MG: 2 CAPSULE ORAL at 08:06

## 2022-06-12 RX ADMIN — DIPHENOXYLATE HYDROCHLORIDE AND ATROPINE SULFATE 2 TABLET: 2.5; .025 TABLET ORAL at 08:06

## 2022-06-12 RX ADMIN — SODIUM CHLORIDE, PRESERVATIVE FREE 10 ML: 5 INJECTION INTRAVENOUS at 12:06

## 2022-06-12 RX ADMIN — DIPHENOXYLATE HYDROCHLORIDE AND ATROPINE SULFATE 2 TABLET: 2.5; .025 TABLET ORAL at 05:06

## 2022-06-12 RX ADMIN — DIPHENOXYLATE HYDROCHLORIDE AND ATROPINE SULFATE 2 TABLET: 2.5; .025 TABLET ORAL at 01:06

## 2022-06-12 RX ADMIN — SODIUM CHLORIDE, PRESERVATIVE FREE 10 ML: 5 INJECTION INTRAVENOUS at 05:06

## 2022-06-12 RX ADMIN — SODIUM CHLORIDE, PRESERVATIVE FREE 10 ML: 5 INJECTION INTRAVENOUS at 01:06

## 2022-06-12 RX ADMIN — MONTELUKAST SODIUM 1 G: 4 TABLET, CHEWABLE ORAL at 08:06

## 2022-06-12 RX ADMIN — POTASSIUM CHLORIDE 20 MEQ: 14.9 INJECTION, SOLUTION INTRAVENOUS at 06:06

## 2022-06-12 RX ADMIN — ENOXAPARIN SODIUM 40 MG: 40 INJECTION SUBCUTANEOUS at 05:06

## 2022-06-12 RX ADMIN — SODIUM CHLORIDE, PRESERVATIVE FREE 10 ML: 5 INJECTION INTRAVENOUS at 06:06

## 2022-06-12 NOTE — PROGRESS NOTES
Progress Note  Gastroenterology    Admit Date: 6/6/2022   LOS: 6 days     SUBJECTIVE:     Follow-up For: Diarrhea improving. Stool cult pos for vibrio cholera. Antibiotics started    Scheduled Meds:   ciprofloxacin  400 mg Intravenous Q12H    colestipoL  1 g Oral BID    diphenoxylate-atropine 2.5-0.025 mg  2 tablet Oral QID    enoxaparin  40 mg Subcutaneous Daily    ergocalciferol  50,000 Units Oral Q7 Days    sodium chloride 0.9%  10 mL Intravenous Q6H     Continuous Infusions:  PRN Meds:acetaminophen, acetaminophen, dextrose 10%, dextrose 10%, glucagon (human recombinant), glucose, glucose, loperamide, ondansetron, sodium chloride 0.9%, Flushing PICC Protocol **AND** sodium chloride 0.9% **AND** sodium chloride 0.9%    Review of patient's allergies indicates:  No Known Allergies      OBJECTIVE:     Vital Signs (Most Recent)  Temp: 98.1 °F (36.7 °C) (06/12/22 0813)  Pulse: 69 (06/12/22 0813)  Resp: 16 (06/11/22 2000)  BP: 105/69 (06/12/22 0813)  SpO2: 100 % (06/12/22 0813)    Temperature Range Min/Max (Last 24H):  Temp:  [97.8 °F (36.6 °C)-98.1 °F (36.7 °C)]     I & O (Last 24H):    Intake/Output Summary (Last 24 hours) at 6/12/2022 1039  Last data filed at 6/11/2022 1500  Gross per 24 hour   Intake 500 ml   Output --   Net 500 ml     Physical Exam:  Physical Exam  Constitutional:       Appearance: Normal appearance.   HENT:      Head: Normocephalic.      Nose: Nose normal.   Eyes:      Conjunctiva/sclera: Conjunctivae normal.   Cardiovascular:      Rate and Rhythm: Normal rate and regular rhythm.   Pulmonary:      Effort: Pulmonary effort is normal.      Breath sounds: Normal breath sounds.   Abdominal:      General: Abdomen is flat. Bowel sounds are normal.      Palpations: Abdomen is soft.   Skin:     General: Skin is warm and dry.   Neurological:      Mental Status: She is alert and oriented to person, place, and time. Mental status is at baseline.   Psychiatric:         Mood and Affect: Mood normal.           Laboratory:       ASSESSMENT/PLAN:     Vibrio enterocolitis    Plan:K relpetion in progress.  Continue antibiotics   Prob DC tomorrow   Yes

## 2022-06-12 NOTE — PROGRESS NOTES
[facility] Hospital Medicine Progress Note        Chief Complaint: Inpatient Follow-up for Acute diarrhea    HPI:   Zoe Randall is a 51 y.o. White female with a past medical history of gastric sleeve 2017 and duodenal switch on April 5th, 2022 in [place], [place] with Dr. Franck Choi. The patient presented to [facility] on 6/6/2022 with a primary complaint of generalized abdominal cramping, vomiting and diarrhea which began 6/3/2022 after eating chicken crackling. Patient reports she was started on a full regular diet on week 8 and has been tolerating other foods well. At start of symptoms patient was having 2-3 episodes of diarrhea every hour but over the last day once every 3 hours. She states vomiting began yesterday (6/5/2022).  She reports associated symptoms of nausea, headache and muscle cramps.  She denies complaints of fever, chest pain, shortness a breath and sick contacts. She reports an approximate 20 pound weight loss since onset of symptoms.       Upon presentation to the ED, heart rate 124 and hemodynamically stable. Labs notable for H&H 16.5/50.6, WBC 7.6, chloride 111, CO2 12, BUN/creatinine 19.7/1.86, glucose 140, alkaline phosphatase 159, AST 48 and ALT 62. In the ED patient received Thiamine and Zofran. Patient is admitted to hospital medicine services for further medical management. CT abd was unremarkable. She was started on IVF, Imodium and lomotil. GI was aconulted further eval while K was replaced aggressively due to loos from diarrhea. Stool cultures grew V. Cholera and cipro was added. Colestipol was added by GI and that help ed with symptoms.    Interval Hx:     Afebrile hemodynamically stable.  Diarrhea improving in frequency.  Tolerating regular diet well.  Denies any nausea or vomiting.  Stool cultures grew vibrio cholera.  Antibiotics were started yesterday.  PCR pending    Objective/physical exam:  Vitals:    06/11/22 2000 06/12/22 0108 06/12/22 0300  06/12/22 0813   BP: 116/74 (!) 94/53 100/63 105/69   Pulse: 73 63 65 69   Resp: 16      Temp: 97.8 °F (36.6 °C) 97.9 °F (36.6 °C) 98 °F (36.7 °C) 98.1 °F (36.7 °C)   TempSrc:  Oral  Oral   SpO2: 97% 99% 98% 100%   Weight:       Height:          General: In no acute distress, afebrile  Respiratory: Clear to auscultation bilaterally  Cardiovascular: S1, S2, no appreciable murmur  Abdomen: Soft, nontender, BS +  MSK: Warm, no lower extremity edema, no clubbing or cyanosis  Neurologic: Alert and oriented x4, moving all extremities with good strength       Lab Results   Component Value Date     06/12/2022    K 2.7 (LL) 06/12/2022    CO2 28 06/12/2022    BUN 4.4 (L) 06/12/2022    CREATININE 0.64 06/12/2022    CALCIUM 9.0 06/12/2022    EGFRNONAA >60 06/12/2022      Lab Results   Component Value Date    ALT 91 (H) 06/11/2022    AST 73 (H) 06/11/2022    ALKPHOS 119 06/11/2022    BILITOT 0.6 06/11/2022      Lab Results   Component Value Date    WBC 4.6 06/10/2022    HGB 12.2 06/10/2022    HCT 37.5 06/10/2022    MCV 91.5 06/10/2022     06/10/2022        Scheduled Med:   amitriptyline  75 mg Oral QHS    atenoloL  100 mg Oral Daily    atorvastatin  80 mg Oral QHS    bisacodyL  10 mg Rectal Daily    ceFAZolin (ANCEF) IVPB  2 g Intravenous Q8H    [START ON 5/8/2022] enoxaparin  40 mg Subcutaneous Daily    gabapentin  600 mg Oral TID    hydrOXYzine HCL  25 mg Oral TID    LIDOcaine (PF) 10 mg/ml (1%)  1 mL Intradermal Once    lisinopriL  5 mg Oral Daily    metFORMIN  1,000 mg Oral BID WM    methocarbamoL  750 mg Oral TID    senna-docusate 8.6-50 mg  2 tablet Oral BID    terbinafine HCL  250 mg Oral Daily    triamterene-hydrochlorothiazide 37.5-25 mg  2 tablet Oral Daily    Continuous Infusions:   sodium chloride 0.9% 100 mL/hr at 05/07/22 0646    electrolyte-A      PRN Meds:  acetaminophen, acetaminophen, aluminum-magnesium hydroxide-simethicone, calcium carbonate, ceFAZolin 2 g/50mL Dextrose IVPB,  dextrose 10%, dextrose 50%, dextrose 50%, glucagon (human recombinant), glucose, glucose, HYDROcodone-acetaminophen, HYDROcodone-acetaminophen, HYDROcodone-acetaminophen, HYDROmorphone, insulin aspart U-100, methocarbamoL, midazolam, morphine, morphine, ondansetron, ondansetron, prochlorperazine, sodium chloride 0.9%, sodium chloride 0.9%       Assessment/Plan:    Acute Vibrio cholera diarrhea  Severe hypokalemia, hypomagnesemia due to above  NAGMA due to above-improving  Reactive leukocytosis due to above  TRINH due to dehydration-improving    Morbid obesity s/p gastric sleeve and duodenal switch/5/22    Plan:  - contiue Cipro 400 Iv BID for three days. Can switch to PO eventually.  Stool tested negative for C. difficile.  Pending Gi PCR. GI following now.    --Continue Colestipol. as needed Lomotil and Imodium.  - Replete potassium.  Monitor electrolytes  - DC IV fluids. Encourage oral hydration nutrition.  Advance diet as tolerated.    - Encourage ambulation.  Other appropriate home medications will be reviewed and renewed.  Added vitamin D therapy    Labs for tomorrow  Full code   Possible dc tomorrow    Sylvester Enrique MD

## 2022-06-13 VITALS
OXYGEN SATURATION: 95 % | WEIGHT: 261 LBS | HEIGHT: 65 IN | SYSTOLIC BLOOD PRESSURE: 111 MMHG | TEMPERATURE: 98 F | HEART RATE: 67 BPM | BODY MASS INDEX: 43.49 KG/M2 | DIASTOLIC BLOOD PRESSURE: 73 MMHG | RESPIRATION RATE: 16 BRPM

## 2022-06-13 PROBLEM — A00.0: Status: ACTIVE | Noted: 2022-06-13

## 2022-06-13 LAB
ANION GAP SERPL CALC-SCNC: 10 MEQ/L
BUN SERPL-MCNC: 5.6 MG/DL (ref 9.8–20.1)
CALCIUM SERPL-MCNC: 8.8 MG/DL (ref 8.4–10.2)
CHLORIDE SERPL-SCNC: 109 MMOL/L (ref 98–107)
CO2 SERPL-SCNC: 24 MMOL/L (ref 22–29)
CREAT SERPL-MCNC: 0.56 MG/DL (ref 0.55–1.02)
CREAT/UREA NIT SERPL: 10
GLUCOSE SERPL-MCNC: 85 MG/DL (ref 74–100)
MAGNESIUM SERPL-MCNC: 1.9 MG/DL (ref 1.6–2.6)
POTASSIUM SERPL-SCNC: 3.4 MMOL/L (ref 3.5–5.1)
SODIUM SERPL-SCNC: 143 MMOL/L (ref 136–145)

## 2022-06-13 PROCEDURE — A4216 STERILE WATER/SALINE, 10 ML: HCPCS | Performed by: STUDENT IN AN ORGANIZED HEALTH CARE EDUCATION/TRAINING PROGRAM

## 2022-06-13 PROCEDURE — 80048 BASIC METABOLIC PNL TOTAL CA: CPT | Performed by: INTERNAL MEDICINE

## 2022-06-13 PROCEDURE — 36415 COLL VENOUS BLD VENIPUNCTURE: CPT | Performed by: INTERNAL MEDICINE

## 2022-06-13 PROCEDURE — 25000003 PHARM REV CODE 250: Performed by: INTERNAL MEDICINE

## 2022-06-13 PROCEDURE — 83735 ASSAY OF MAGNESIUM: CPT | Performed by: INTERNAL MEDICINE

## 2022-06-13 PROCEDURE — 25000003 PHARM REV CODE 250: Performed by: STUDENT IN AN ORGANIZED HEALTH CARE EDUCATION/TRAINING PROGRAM

## 2022-06-13 RX ORDER — ERGOCALCIFEROL 1.25 MG/1
50000 CAPSULE ORAL
Qty: 12 CAPSULE | Refills: 0 | Status: SHIPPED | OUTPATIENT
Start: 2022-06-17 | End: 2022-09-03

## 2022-06-13 RX ORDER — POTASSIUM CHLORIDE 20 MEQ/1
20 TABLET, EXTENDED RELEASE ORAL ONCE
Status: COMPLETED | OUTPATIENT
Start: 2022-06-13 | End: 2022-06-13

## 2022-06-13 RX ORDER — LOPERAMIDE HYDROCHLORIDE 2 MG/1
4 CAPSULE ORAL 3 TIMES DAILY PRN
Qty: 15 CAPSULE | Refills: 0 | Status: SHIPPED | OUTPATIENT
Start: 2022-06-13 | End: 2022-06-23

## 2022-06-13 RX ORDER — POTASSIUM CHLORIDE 20 MEQ/1
20 TABLET, EXTENDED RELEASE ORAL 2 TIMES DAILY
Qty: 5 TABLET | Refills: 0 | Status: SHIPPED | OUTPATIENT
Start: 2022-06-13 | End: 2022-06-14

## 2022-06-13 RX ORDER — MONTELUKAST SODIUM 4 MG/1
1 TABLET, CHEWABLE ORAL 2 TIMES DAILY
Qty: 14 TABLET | Refills: 0 | Status: SHIPPED | OUTPATIENT
Start: 2022-06-13 | End: 2022-06-20

## 2022-06-13 RX ORDER — CIPROFLOXACIN 500 MG/1
500 TABLET ORAL EVERY 12 HOURS
Status: DISCONTINUED | OUTPATIENT
Start: 2022-06-13 | End: 2022-06-13 | Stop reason: HOSPADM

## 2022-06-13 RX ORDER — ONDANSETRON 4 MG/1
4 TABLET, ORALLY DISINTEGRATING ORAL EVERY 8 HOURS PRN
Qty: 10 TABLET | Refills: 0 | Status: SHIPPED | OUTPATIENT
Start: 2022-06-13 | End: 2022-06-18

## 2022-06-13 RX ORDER — CIPROFLOXACIN 500 MG/1
500 TABLET ORAL EVERY 12 HOURS
Qty: 2 TABLET | Refills: 0 | Status: SHIPPED | OUTPATIENT
Start: 2022-06-13 | End: 2022-06-15

## 2022-06-13 RX ORDER — ONDANSETRON 2 MG/ML
4 INJECTION INTRAMUSCULAR; INTRAVENOUS EVERY 4 HOURS PRN
Qty: 10 EACH | Refills: 0 | Status: SHIPPED | OUTPATIENT
Start: 2022-06-13 | End: 2022-06-13 | Stop reason: HOSPADM

## 2022-06-13 RX ADMIN — SODIUM CHLORIDE, PRESERVATIVE FREE 10 ML: 5 INJECTION INTRAVENOUS at 01:06

## 2022-06-13 RX ADMIN — MONTELUKAST SODIUM 1 G: 4 TABLET, CHEWABLE ORAL at 10:06

## 2022-06-13 RX ADMIN — DIPHENOXYLATE HYDROCHLORIDE AND ATROPINE SULFATE 2 TABLET: 2.5; .025 TABLET ORAL at 01:06

## 2022-06-13 RX ADMIN — DIPHENOXYLATE HYDROCHLORIDE AND ATROPINE SULFATE 2 TABLET: 2.5; .025 TABLET ORAL at 10:06

## 2022-06-13 RX ADMIN — POTASSIUM CHLORIDE 20 MEQ: 1500 TABLET, EXTENDED RELEASE ORAL at 11:06

## 2022-06-13 NOTE — DISCHARGE SUMMARY
[facility]  [facility] Medicine  Discharge Summary      Patient Name: Zoe Randall  MRN: [id number]  Patient Class: IP- Inpatient  Admission Date: 6/6/2022  Hospital Length of Stay: 7 days  Discharge Date and Time:  06/13/2022 10:37 AM  Attending Physician: Seun Ward MD   Discharging Provider: Sylvester Enrique MD  Primary Care Provider: Bonnie Bella MD    Zoe Randall is a 51 y.o. White female with a past medical history of gastric sleeve 2017 and duodenal switch on April 5th, 2022 in [place], [place] with Dr. Franck Choi. The patient presented to [facility] on 6/6/2022 with a primary complaint of generalized abdominal cramping, vomiting and diarrhea which began 6/3/2022 after eating chicken crackling. Patient reports she was started on a full regular diet on week 8 and has been tolerating other foods well. At start of symptoms patient was having 2-3 episodes of diarrhea every hour but over the last day once every 3 hours. She states vomiting began yesterday (6/5/2022).  She reports associated symptoms of nausea, headache and muscle cramps.  She denies complaints of fever, chest pain, shortness a breath and sick contacts. She reports an approximate 20 pound weight loss since onset of symptoms.       Upon presentation to the ED, heart rate 124 and hemodynamically stable. Labs notable for H&H 16.5/50.6, WBC 7.6, chloride 111, CO2 12, BUN/creatinine 19.7/1.86, glucose 140, alkaline phosphatase 159, AST 48 and ALT 62. In the ED patient received Thiamine and Zofran. Patient is admitted to hospital medicine services for further medical management. CT abd was unremarkable. She was started on IVF, Imodium and lomotil. GI was aconulted further eval while K was replaced aggressively due to loos from diarrhea. Stool cultures grew V. Cholera and cipro was added. Colestipol was added by GI and that help ed with symptoms. Diarrheas has improved, so is her PO intake. Medically stable  for discharge. All medications reconciled, encouraged to follow with GI and PCP. Prescriptions provided, she will complete Ciprofloxacin as outpatient. Provided few day potassium supplementation at discharge.    Acute Vibrio cholera diarrhea- improving  Severe hypokalemia, hypomagnesemia due to above - improving  Reactive leukocytosis due to above  TRINH due to dehydration-improved     Morbid obesity s/p gastric sleeve and duodenal switch/5/22    Goals of Care Treatment Preferences:  Code Status: Full Code      Consults:   Consults (From admission, onward)        Status Ordering Provider     Inpatient consult to General Surgery  Once        Provider:  Oswaldo Ladd MD    Acknowledged SERA WINKLER     Inpatient consult to Midline team  Once        Provider:  (Not yet assigned)    Acknowledged WAGNER POWELL     Inpatient consult to Gastroenterology  Once        Provider:  Solomon Cortez MD    Completed WAGNER POWELL     Inpatient consult to General Surgery  Once        Provider:  Nakul Powell Jr., MD    Acknowledged WAGNER POWELL     Inpatient consult to General Surgery  Once        Provider:  El Devine MD    Completed KAYLA KC          No new Assessment & Plan notes have been filed under this hospital service since the last note was generated.  Service: Hospital Medicine    Final Active Diagnoses:    Diagnosis Date Noted POA    PRINCIPAL PROBLEM:  Infection due to vibrio cholerae [A00.0] 06/13/2022 Yes      Problems Resolved During this Admission:       Discharged Condition: good    Disposition: Home or Self Care    Follow Up:   Follow-up Information     Bonnie Bella MD Follow up in 1 week(s).    Specialty: Family Medicine  Contact information:  [address] [place] [address]  [place] [id number] [phone]                       Patient Instructions:   No discharge procedures on file.    Significant Diagnostic Studies: Labs:   BMP:   Recent Labs   Lab 06/12/22  0348 06/13/22  0356   NA  140 143   K 2.7* 3.4*   CO2 28 24   BUN 4.4* 5.6*   CREATININE 0.64 0.56   CALCIUM 9.0 8.8   MG 1.90 1.90       Pending Diagnostic Studies:     Procedure Component Value Units Date/Time    GI Pathogen PanelCHENG F [[id number]]     Order Status: Sent Lab Status: No result     Specimen: Stool     SHERIDAN GENERIC ORDERABLE GIP [[id number]] Collected: 06/11/22 0700    Order Status: Sent Lab Status: In process Updated: 06/11/22 0739    Specimen: Stool          Medications:  Reconciled Home Medications:      Medication List      START taking these medications    ciprofloxacin HCl 500 MG tablet  Commonly known as: CIPRO  Take 1 tablet (500 mg total) by mouth every 12 (twelve) hours. for 2 days     colestipoL 1 gram Tab  Commonly known as: COLESTID  Take 1 tablet (1 g total) by mouth 2 (two) times daily. for 7 days     ergocalciferol 50,000 unit Cap  Commonly known as: ERGOCALCIFEROL  Take 1 capsule (50,000 Units total) by mouth every 7 days. for 12 doses  Start taking on: June 17, 2022     loperamide 2 mg capsule  Commonly known as: IMODIUM  Take 2 capsules (4 mg total) by mouth 3 (three) times daily as needed for Diarrhea.     ondansetron 4 MG Tbdl  Commonly known as: ZOFRAN-ODT  Take 1 tablet (4 mg total) by mouth every 8 (eight) hours as needed (Nausea).     potassium chloride SA 20 MEQ tablet  Commonly known as: K-DUR,KLOR-CON  Take 1 tablet (20 mEq total) by mouth 2 (two) times daily. for 5 days            Indwelling Lines/Drains at time of discharge:   Lines/Drains/Airways     None                 Time spent on the discharge of patient: 35 minutes         Sylvester Enrique MD  Department of Hospital Medicine  [facility] - 8th Floor Med Surg

## 2022-06-13 NOTE — PROGRESS NOTES
Progress Note  Gastroenterology    Admit Date: 6/6/2022   LOS: 7 days     SUBJECTIVE:     6/13/22: Diarrhea continues to improve. Stool cult pos for vibrio cholera. Cipro started    Scheduled Meds:   ciprofloxacin HCl  500 mg Oral Q12H    colestipoL  1 g Oral BID    diphenoxylate-atropine 2.5-0.025 mg  2 tablet Oral QID    enoxaparin  40 mg Subcutaneous Daily    ergocalciferol  50,000 Units Oral Q7 Days    sodium chloride 0.9%  10 mL Intravenous Q6H     Continuous Infusions:  PRN Meds:acetaminophen, acetaminophen, dextrose 10%, dextrose 10%, glucagon (human recombinant), glucose, glucose, loperamide, ondansetron, sodium chloride 0.9%, Flushing PICC Protocol **AND** sodium chloride 0.9% **AND** sodium chloride 0.9%    Review of patient's allergies indicates:  No Known Allergies      OBJECTIVE:     Vital Signs (Most Recent)  Temp: 98 °F (36.7 °C) (06/13/22 0941)  Pulse: 72 (06/13/22 0941)  Resp: 16 (06/12/22 2000)  BP: 103/72 (06/13/22 0941)  SpO2: (!) 94 % (06/13/22 0941)    Temperature Range Min/Max (Last 24H):  Temp:  [97.9 °F (36.6 °C)-98.3 °F (36.8 °C)]     I & O (Last 24H):    Intake/Output Summary (Last 24 hours) at 6/13/2022 1007  Last data filed at 6/12/2022 1500  Gross per 24 hour   Intake 480 ml   Output --   Net 480 ml     Physical Exam:  Physical Exam  Constitutional:       Appearance: Normal appearance.   HENT:      Head: Normocephalic.      Nose: Nose normal.   Eyes:      Conjunctiva/sclera: Conjunctivae normal.   Cardiovascular:      Rate and Rhythm: Normal rate and regular rhythm.   Pulmonary:      Effort: Pulmonary effort is normal.      Breath sounds: Normal breath sounds.   Abdominal:      General: Abdomen is flat. Bowel sounds are normal.      Palpations: Abdomen is soft.   Skin:     General: Skin is warm and dry.   Neurological:      Mental Status: She is alert and oriented to person, place, and time. Mental status is at baseline.   Psychiatric:         Mood and Affect: Mood normal.           Laboratory:     06/13/22 0506  Basic Metabolic Panel   Collected: 06/13/22 0356  Final result  Specimen: Blood    Sodium Level 143 mmol/L Creatinine 0.56 mg/dL   Potassium Level 3.4 Low  mmol/L BUN/Creatinine Ratio 10   Chloride 109 High  mmol/L Calcium Level Total 8.8 mg/dL   Carbon Dioxide 24 mmol/L Estimated GFR-Non  >60 mls/min/1.73/m2   Glucose Level 85 mg/dL Anion Gap 10.0 mEq/L   Blood Urea Nitrogen 5.6 Low  mg/dL            06/13/22 0506  Magnesium   Collected: 06/13/22 0356  Final result  Specimen: Blood    Magnesium Level 1.90 mg/dL              ASSESSMENT/PLAN:     Vibrio enterocolitis    Plan:K repletion in progress.  Continue antibiotics   Prob DC today with follow up with Bonnie Garibay NP with Maryanne Evans MD    Will sign off  Call if need    Elana Pelletier NP acting as scribe for Neha Calzada MD  Gastroenterology  [facility] 8th floor    The documentation recorded by the scribe/NP accurately reflects the service I personally performed and the decisions made by me.       Neha Calzada Iii, MD  [facility]

## 2022-06-15 ENCOUNTER — PATIENT OUTREACH (OUTPATIENT)
Dept: ADMINISTRATIVE | Facility: CLINIC | Age: 52
End: 2022-06-15
Payer: COMMERCIAL

## 2022-06-15 LAB — MAYO GENERIC ORDERABLE RESULT: ABNORMAL

## 2022-06-15 NOTE — PROGRESS NOTES
C3 nurse spoke with Zoe Randall for a TCC post hospital discharge follow up call. The patient has a scheduled [facility] appointment with Bonnie Bella MD on 6/16/2022 @ 1400.

## 2022-06-17 LAB
BACTERIA STL CULT: ABNORMAL
BACTERIA STL CULT: ABNORMAL

## 2024-03-26 LAB
PAP RECOMMENDATION EXT: NORMAL
PAP SMEAR: NORMAL

## 2025-05-29 ENCOUNTER — TELEPHONE (OUTPATIENT)
Dept: INTERNAL MEDICINE | Facility: CLINIC | Age: 55
End: 2025-05-29
Payer: COMMERCIAL

## 2025-05-29 NOTE — TELEPHONE ENCOUNTER
Copied from CRM #[id number]. Topic: Appointments - Appointment Scheduling  >> May 29, 2025  2:24 PM Maame Longo wrote:  Who Called: Zoe Randall    Caller is requesting assistance/information from provider's office.    Symptoms (please be specific):    How long has patient had these symptoms:    List of preferred pharmacies on file (remove unneeded): [unfilled]  If different, enter pharmacy into here including location and phone number:       Preferred Method of Contact: Phone Call  Patient's Preferred Phone Number on File: [phone]   Best Call Back Number, if different:  Additional Information: pt once saw dr cannno before in the past and is requesting to come back but pcp is not accepting new pts can someone help.

## 2025-06-04 ENCOUNTER — TELEPHONE (OUTPATIENT)
Dept: INTERNAL MEDICINE | Facility: CLINIC | Age: 55
End: 2025-06-04

## 2025-06-04 ENCOUNTER — OFFICE VISIT (OUTPATIENT)
Dept: INTERNAL MEDICINE | Facility: CLINIC | Age: 55
End: 2025-06-04
Payer: COMMERCIAL

## 2025-06-04 ENCOUNTER — LAB VISIT (OUTPATIENT)
Dept: LAB | Facility: HOSPITAL | Age: 55
End: 2025-06-04
Attending: INTERNAL MEDICINE
Payer: COMMERCIAL

## 2025-06-04 VITALS
WEIGHT: 184 LBS | SYSTOLIC BLOOD PRESSURE: 128 MMHG | HEIGHT: 65 IN | OXYGEN SATURATION: 98 % | TEMPERATURE: 98 F | DIASTOLIC BLOOD PRESSURE: 72 MMHG | RESPIRATION RATE: 14 BRPM | HEART RATE: 66 BPM | BODY MASS INDEX: 30.66 KG/M2

## 2025-06-04 DIAGNOSIS — Z00.00 WELLNESS EXAMINATION: Primary | ICD-10-CM

## 2025-06-04 DIAGNOSIS — R53.82 CHRONIC FATIGUE: ICD-10-CM

## 2025-06-04 DIAGNOSIS — Z00.00 WELLNESS EXAMINATION: ICD-10-CM

## 2025-06-04 DIAGNOSIS — Z13.6 ENCOUNTER FOR SCREENING FOR CARDIOVASCULAR DISORDERS: ICD-10-CM

## 2025-06-04 LAB
ALBUMIN SERPL-MCNC: 3.8 G/DL (ref 3.5–5)
ALBUMIN/GLOB SERPL: 1.2 RATIO (ref 1.1–2)
ALP SERPL-CCNC: 186 UNIT/L (ref 40–150)
ALT SERPL-CCNC: 70 UNIT/L (ref 0–55)
ANION GAP SERPL CALC-SCNC: 6 MEQ/L
AST SERPL-CCNC: 62 UNIT/L (ref 11–45)
BACTERIA #/AREA URNS AUTO: ABNORMAL /HPF
BASOPHILS # BLD AUTO: 0.04 X10(3)/MCL
BASOPHILS NFR BLD AUTO: 1 %
BILIRUB SERPL-MCNC: 0.7 MG/DL
BILIRUB UR QL STRIP.AUTO: NEGATIVE
BUN SERPL-MCNC: 9.3 MG/DL (ref 9.8–20.1)
CALCIUM SERPL-MCNC: 8.6 MG/DL (ref 8.4–10.2)
CHLORIDE SERPL-SCNC: 108 MMOL/L (ref 98–107)
CHOLEST SERPL-MCNC: 99 MG/DL
CHOLEST/HDLC SERPL: 2 {RATIO} (ref 0–5)
CLARITY UR: CLEAR
CO2 SERPL-SCNC: 30 MMOL/L (ref 22–29)
COLOR UR AUTO: YELLOW
CREAT SERPL-MCNC: 0.55 MG/DL (ref 0.55–1.02)
CREAT/UREA NIT SERPL: 17
EOSINOPHIL # BLD AUTO: 0.07 X10(3)/MCL (ref 0–0.9)
EOSINOPHIL NFR BLD AUTO: 1.7 %
ERYTHROCYTE [DISTWIDTH] IN BLOOD BY AUTOMATED COUNT: 13.6 % (ref 11.5–17)
EST. AVERAGE GLUCOSE BLD GHB EST-MCNC: 76.7 MG/DL
FERRITIN SERPL-MCNC: 95.38 NG/ML (ref 4.63–204)
FOLATE SERPL-MCNC: 15.7 NG/ML (ref 7–31.4)
GFR SERPLBLD CREATININE-BSD FMLA CKD-EPI: >60 ML/MIN/1.73/M2
GLOBULIN SER-MCNC: 3.2 GM/DL (ref 2.4–3.5)
GLUCOSE SERPL-MCNC: 85 MG/DL (ref 74–100)
GLUCOSE UR QL STRIP: NORMAL
HBA1C MFR BLD: 4.3 %
HCT VFR BLD AUTO: 38.1 % (ref 37–47)
HDLC SERPL-MCNC: 50 MG/DL (ref 35–60)
HGB BLD-MCNC: 12 G/DL (ref 12–16)
HGB UR QL STRIP: NEGATIVE
IMM GRANULOCYTES # BLD AUTO: 0.01 X10(3)/MCL (ref 0–0.04)
IMM GRANULOCYTES NFR BLD AUTO: 0.2 %
IRON SATN MFR SERPL: 29 % (ref 20–50)
IRON SERPL-MCNC: 97 UG/DL (ref 50–170)
KETONES UR QL STRIP: NEGATIVE
LDLC SERPL CALC-MCNC: 39 MG/DL (ref 50–140)
LEUKOCYTE ESTERASE UR QL STRIP: NEGATIVE
LYMPHOCYTES # BLD AUTO: 1.79 X10(3)/MCL (ref 0.6–4.6)
LYMPHOCYTES NFR BLD AUTO: 44.5 %
MCH RBC QN AUTO: 30.6 PG (ref 27–31)
MCHC RBC AUTO-ENTMCNC: 31.5 G/DL (ref 33–36)
MCV RBC AUTO: 97.2 FL (ref 80–94)
MONOCYTES # BLD AUTO: 0.28 X10(3)/MCL (ref 0.1–1.3)
MONOCYTES NFR BLD AUTO: 7 %
MUCOUS THREADS URNS QL MICRO: ABNORMAL /LPF
NEUTROPHILS # BLD AUTO: 1.83 X10(3)/MCL (ref 2.1–9.2)
NEUTROPHILS NFR BLD AUTO: 45.6 %
NITRITE UR QL STRIP: NEGATIVE
NRBC BLD AUTO-RTO: 0 %
PH UR STRIP: 5.5 [PH]
PLATELET # BLD AUTO: 157 X10(3)/MCL (ref 130–400)
PMV BLD AUTO: 10.3 FL (ref 7.4–10.4)
POTASSIUM SERPL-SCNC: 3.6 MMOL/L (ref 3.5–5.1)
PROT SERPL-MCNC: 7 GM/DL (ref 6.4–8.3)
PROT UR QL STRIP: NEGATIVE
RBC # BLD AUTO: 3.92 X10(6)/MCL (ref 4.2–5.4)
RBC #/AREA URNS AUTO: ABNORMAL /HPF
SODIUM SERPL-SCNC: 144 MMOL/L (ref 136–145)
SP GR UR STRIP.AUTO: 1.02 (ref 1–1.03)
SQUAMOUS #/AREA URNS LPF: ABNORMAL /HPF
T3FREE SERPL-MCNC: 2.66 PG/ML (ref 1.58–3.91)
T4 FREE SERPL-MCNC: 0.9 NG/DL (ref 0.7–1.48)
TIBC SERPL-MCNC: 240 UG/DL (ref 70–310)
TIBC SERPL-MCNC: 337 UG/DL (ref 250–450)
TRANSFERRIN SERPL-MCNC: 306 MG/DL (ref 180–382)
TRIGL SERPL-MCNC: 48 MG/DL (ref 37–140)
TSH SERPL-ACNC: 1.71 UIU/ML (ref 0.35–4.94)
UROBILINOGEN UR STRIP-ACNC: NORMAL
VIT B12 SERPL-MCNC: 948 PG/ML (ref 213–816)
VLDLC SERPL CALC-MCNC: 10 MG/DL
WBC # BLD AUTO: 4.02 X10(3)/MCL (ref 4.5–11.5)
WBC #/AREA URNS AUTO: ABNORMAL /HPF

## 2025-06-04 PROCEDURE — 84443 ASSAY THYROID STIM HORMONE: CPT

## 2025-06-04 PROCEDURE — 83036 HEMOGLOBIN GLYCOSYLATED A1C: CPT

## 2025-06-04 PROCEDURE — 84439 ASSAY OF FREE THYROXINE: CPT

## 2025-06-04 PROCEDURE — 3008F BODY MASS INDEX DOCD: CPT | Mod: CPTII,,, | Performed by: INTERNAL MEDICINE

## 2025-06-04 PROCEDURE — 3074F SYST BP LT 130 MM HG: CPT | Mod: CPTII,,, | Performed by: INTERNAL MEDICINE

## 2025-06-04 PROCEDURE — 82607 VITAMIN B-12: CPT

## 2025-06-04 PROCEDURE — 82728 ASSAY OF FERRITIN: CPT

## 2025-06-04 PROCEDURE — 84590 ASSAY OF VITAMIN A: CPT

## 2025-06-04 PROCEDURE — 99386 PREV VISIT NEW AGE 40-64: CPT | Mod: ,,, | Performed by: INTERNAL MEDICINE

## 2025-06-04 PROCEDURE — 3078F DIAST BP <80 MM HG: CPT | Mod: CPTII,,, | Performed by: INTERNAL MEDICINE

## 2025-06-04 PROCEDURE — 82525 ASSAY OF COPPER: CPT

## 2025-06-04 PROCEDURE — 1160F RVW MEDS BY RX/DR IN RCRD: CPT | Mod: CPTII,,, | Performed by: INTERNAL MEDICINE

## 2025-06-04 PROCEDURE — 84481 FREE ASSAY (FT-3): CPT

## 2025-06-04 PROCEDURE — 36415 COLL VENOUS BLD VENIPUNCTURE: CPT

## 2025-06-04 PROCEDURE — 82746 ASSAY OF FOLIC ACID SERUM: CPT

## 2025-06-04 PROCEDURE — 80061 LIPID PANEL: CPT

## 2025-06-04 PROCEDURE — 81001 URINALYSIS AUTO W/SCOPE: CPT

## 2025-06-04 PROCEDURE — 85025 COMPLETE CBC W/AUTO DIFF WBC: CPT

## 2025-06-04 PROCEDURE — 80053 COMPREHEN METABOLIC PANEL: CPT

## 2025-06-04 PROCEDURE — 83550 IRON BINDING TEST: CPT

## 2025-06-04 PROCEDURE — 1159F MED LIST DOCD IN RCRD: CPT | Mod: CPTII,,, | Performed by: INTERNAL MEDICINE

## 2025-06-05 ENCOUNTER — HOSPITAL ENCOUNTER (OUTPATIENT)
Dept: RADIOLOGY | Facility: HOSPITAL | Age: 55
Discharge: HOME | End: 2025-06-05
Attending: INTERNAL MEDICINE
Payer: COMMERCIAL

## 2025-06-05 LAB — COPPER SERPL-MCNC: 53 MCG/DL (ref 77–206)

## 2025-06-05 PROCEDURE — 75571 CT HRT W/O DYE W/CA TEST: CPT | Mod: TC

## 2025-06-06 ENCOUNTER — RESULTS FOLLOW-UP (OUTPATIENT)
Dept: INTERNAL MEDICINE | Facility: CLINIC | Age: 55
End: 2025-06-06
Payer: COMMERCIAL

## 2025-06-06 DIAGNOSIS — Z90.3 S/P GASTRIC SLEEVE PROCEDURE: ICD-10-CM

## 2025-06-06 DIAGNOSIS — R74.01 TRANSAMINITIS: Primary | ICD-10-CM

## 2025-06-06 LAB — VIT A SERPL-MCNC: 20.8 MCG/DL (ref 32.5–78)

## 2025-06-24 ENCOUNTER — PATIENT OUTREACH (OUTPATIENT)
Facility: CLINIC | Age: 55
End: 2025-06-24
Payer: COMMERCIAL

## 2025-06-24 NOTE — PROGRESS NOTES
Health Maintenance Topic(s) Outreach Outcomes & Actions Taken:    Colorectal Cancer Screening - Outreach Outcomes & Actions Taken  : External Records Uploaded, Care Team Updated, & History Updated if Applicable     Additional Notes:  Upload Colonoscopy Report: 7/6/2016 Dr JOELLEN Calzada

## 2025-06-30 ENCOUNTER — PATIENT MESSAGE (OUTPATIENT)
Dept: INTERNAL MEDICINE | Facility: CLINIC | Age: 55
End: 2025-06-30
Payer: COMMERCIAL

## 2025-06-30 DIAGNOSIS — R74.01 TRANSAMINITIS: ICD-10-CM

## 2025-06-30 DIAGNOSIS — Z90.3 S/P GASTRIC SLEEVE PROCEDURE: Primary | ICD-10-CM

## 2025-07-01 ENCOUNTER — TELEPHONE (OUTPATIENT)
Dept: INTERNAL MEDICINE | Facility: CLINIC | Age: 55
End: 2025-07-01
Payer: COMMERCIAL

## 2025-07-02 ENCOUNTER — RESULTS FOLLOW-UP (OUTPATIENT)
Dept: INTERNAL MEDICINE | Facility: CLINIC | Age: 55
End: 2025-07-02

## 2025-07-02 ENCOUNTER — HOSPITAL ENCOUNTER (OUTPATIENT)
Dept: RADIOLOGY | Facility: HOSPITAL | Age: 55
Discharge: HOME | End: 2025-07-02
Attending: INTERNAL MEDICINE
Payer: COMMERCIAL

## 2025-07-02 DIAGNOSIS — R74.01 TRANSAMINITIS: ICD-10-CM

## 2025-07-02 PROCEDURE — 76705 ECHO EXAM OF ABDOMEN: CPT | Mod: TC

## 2025-07-09 ENCOUNTER — OFFICE VISIT (OUTPATIENT)
Dept: INTERNAL MEDICINE | Facility: CLINIC | Age: 55
End: 2025-07-09
Payer: COMMERCIAL

## 2025-07-09 VITALS
OXYGEN SATURATION: 98 % | DIASTOLIC BLOOD PRESSURE: 80 MMHG | RESPIRATION RATE: 16 BRPM | HEART RATE: 68 BPM | SYSTOLIC BLOOD PRESSURE: 134 MMHG | HEIGHT: 65 IN | TEMPERATURE: 98 F | BODY MASS INDEX: 30.16 KG/M2 | WEIGHT: 181 LBS

## 2025-07-09 DIAGNOSIS — R79.0 LOW SERUM COPPER LEVEL: Primary | ICD-10-CM

## 2025-07-09 DIAGNOSIS — Z00.00 WELLNESS EXAMINATION: ICD-10-CM

## 2025-07-09 DIAGNOSIS — R74.01 TRANSAMINITIS: ICD-10-CM

## 2025-07-09 DIAGNOSIS — R79.89 LOW SERUM VITAMIN A: ICD-10-CM

## 2025-07-09 NOTE — PROGRESS NOTES
Subjective:       Patient ID: Zoe Randall is a 54 y.o. female.    Chief Complaint: Results    54-year-old white female is here for follow up.  She was seen for her initial visit about 1 month ago and was also seen many years ago in our clinic.      Review of Systems   Constitutional:  Negative for fever.   HENT:  Negative for nosebleeds.    Eyes:  Negative for visual disturbance.   Respiratory:  Negative for shortness of breath.    Cardiovascular:  Negative for chest pain.   Gastrointestinal:  Negative for abdominal pain.   Genitourinary:  Negative for dysuria.   Musculoskeletal:  Negative for gait problem.   Neurological:  Negative for headaches.         Objective:      Physical Exam  HENT:      Head: Normocephalic.      Mouth/Throat:      Pharynx: Oropharynx is clear.   Eyes:      Extraocular Movements: Extraocular movements intact.   Cardiovascular:      Rate and Rhythm: Normal rate and regular rhythm.   Pulmonary:      Breath sounds: Normal breath sounds.   Abdominal:      Palpations: Abdomen is soft.   Musculoskeletal:         General: No swelling.   Skin:     General: Skin is warm.   Neurological:      General: No focal deficit present.      Mental Status: She is alert and oriented to person, place, and time.   Psychiatric:         Mood and Affect: Mood normal.         There were no vitals filed for this visit.   Assessment:       Problem List Items Addressed This Visit       Wellness examination    Low serum copper level - Primary    Low serum vitamin A     Other Visit Diagnoses         Transaminitis                     Plan:       1.  Fatigue: Multifactorial.  Consider sleep study at next visit     2.  Obesity: History of gastric sleeve in 2017 and duodenal switch in 2022.  She was over 300 lb in his now down to 181 lb    3. Low copper level and low vitamin A:  Secondary to gastric surgeries.  Start copper and vitamin-A supplements    4. Transaminitis:  Abdominal ultrasound in 7/2025 shows hepatic  steatosis, LFTs improved since last time.  Hepatitis panel negative 7/2025.  Alkaline phosphatase level is elevated, but improved. AFP slightly elevated at 10.  Liver enzymes have improved, and she lost 3 lb.  Continue dietary changes.  Return to clinic in 6 months to reassess     5. Wellness: Ca score 0 in 2025.  Mammogram 4/2024.  C-scope with Zheng years ago     She is meeting with a dietitian   Her parents are patients of ours.  She is single with 3 children, the youngest 9 years old  She goes to [employer]

## 2025-07-15 ENCOUNTER — PATIENT OUTREACH (OUTPATIENT)
Facility: CLINIC | Age: 55
End: 2025-07-15
Payer: COMMERCIAL

## 2025-07-15 NOTE — PROGRESS NOTES
Health Maintenance Topic(s) Outreach Outcomes & Actions Taken:    Cervical Cancer Screening - Outreach Outcomes & Actions Taken  : External Records Uploaded & Care Team Updated if Applicable       Additional Notes:  Upload Pap Smear Report: 3/26/2024

## 2025-08-25 PROBLEM — R79.89 LOW SERUM VITAMIN A: Status: RESOLVED | Noted: 2025-07-09 | Resolved: 2025-08-25
